# Patient Record
Sex: FEMALE | Race: WHITE | NOT HISPANIC OR LATINO | Employment: UNEMPLOYED | URBAN - METROPOLITAN AREA
[De-identification: names, ages, dates, MRNs, and addresses within clinical notes are randomized per-mention and may not be internally consistent; named-entity substitution may affect disease eponyms.]

---

## 2020-10-06 ENCOUNTER — OFFICE VISIT (OUTPATIENT)
Dept: URGENT CARE | Age: 45
End: 2020-10-06
Payer: COMMERCIAL

## 2020-10-06 VITALS
HEIGHT: 67 IN | BODY MASS INDEX: 27.47 KG/M2 | RESPIRATION RATE: 18 BRPM | DIASTOLIC BLOOD PRESSURE: 78 MMHG | OXYGEN SATURATION: 98 % | TEMPERATURE: 97.3 F | SYSTOLIC BLOOD PRESSURE: 116 MMHG | WEIGHT: 175 LBS | HEART RATE: 71 BPM

## 2020-10-06 DIAGNOSIS — R59.1 LYMPHADENOPATHY: ICD-10-CM

## 2020-10-06 DIAGNOSIS — M26.629 TMJ PAIN DYSFUNCTION SYNDROME: Primary | ICD-10-CM

## 2020-10-06 PROCEDURE — 99213 OFFICE O/P EST LOW 20 MIN: CPT | Performed by: PHYSICIAN ASSISTANT

## 2020-10-06 RX ORDER — MELOXICAM 15 MG/1
TABLET ORAL
COMMUNITY
Start: 2020-05-11

## 2020-10-06 RX ORDER — CITALOPRAM 20 MG/1
TABLET ORAL
COMMUNITY

## 2020-10-06 RX ORDER — ARIPIPRAZOLE 5 MG/1
5 TABLET ORAL DAILY
COMMUNITY
Start: 2020-09-13

## 2020-10-06 RX ORDER — ATORVASTATIN CALCIUM 20 MG/1
TABLET, FILM COATED ORAL
COMMUNITY
Start: 2020-04-23

## 2020-10-06 RX ORDER — ATORVASTATIN CALCIUM 20 MG/1
20 TABLET, FILM COATED ORAL DAILY
COMMUNITY
Start: 2020-07-28

## 2020-10-06 RX ORDER — CLONAZEPAM 1 MG/1
TABLET ORAL
COMMUNITY
Start: 2020-09-18

## 2020-10-06 RX ORDER — CLONAZEPAM 1 MG/1
TABLET ORAL
COMMUNITY
Start: 2020-07-21

## 2020-10-06 RX ORDER — PANTOPRAZOLE SODIUM 40 MG/1
40 TABLET, DELAYED RELEASE ORAL 2 TIMES DAILY
COMMUNITY
Start: 2020-08-12

## 2020-10-06 RX ORDER — CITALOPRAM 40 MG/1
40 TABLET ORAL DAILY
COMMUNITY
Start: 2020-10-01

## 2020-10-06 RX ORDER — CLONAZEPAM 0.5 MG/1
TABLET ORAL
COMMUNITY

## 2020-10-06 RX ORDER — LEVOTHYROXINE SODIUM 0.05 MG/1
50 TABLET ORAL DAILY
COMMUNITY
Start: 2020-07-30

## 2020-10-06 RX ORDER — CLINDAMYCIN HYDROCHLORIDE 300 MG/1
300 CAPSULE ORAL 4 TIMES DAILY
Qty: 40 CAPSULE | Refills: 0 | Status: SHIPPED | OUTPATIENT
Start: 2020-10-06 | End: 2020-10-16

## 2020-10-06 RX ORDER — CITALOPRAM 40 MG/1
40 TABLET ORAL DAILY
COMMUNITY
Start: 2020-07-21

## 2020-10-06 RX ORDER — MELOXICAM 15 MG/1
15 TABLET ORAL DAILY
COMMUNITY
Start: 2020-08-07

## 2020-10-06 RX ORDER — IBUPROFEN 600 MG/1
600 TABLET ORAL EVERY 6 HOURS PRN
Qty: 30 TABLET | Refills: 0 | Status: SHIPPED | OUTPATIENT
Start: 2020-10-06

## 2020-10-06 RX ORDER — LIRAGLUTIDE 6 MG/ML
1.8 INJECTION SUBCUTANEOUS DAILY
COMMUNITY
Start: 2020-05-18

## 2020-10-06 RX ORDER — PEN NEEDLE, DIABETIC 32GX 5/32"
NEEDLE, DISPOSABLE MISCELLANEOUS
COMMUNITY
Start: 2020-09-15

## 2020-10-06 RX ORDER — LEVOTHYROXINE SODIUM 0.05 MG/1
50 TABLET ORAL DAILY
COMMUNITY
Start: 2020-07-31

## 2020-10-06 RX ORDER — ARIPIPRAZOLE 5 MG/1
5 TABLET ORAL DAILY
COMMUNITY
Start: 2020-07-21

## 2022-01-18 ENCOUNTER — OFFICE VISIT (OUTPATIENT)
Dept: URGENT CARE | Age: 47
End: 2022-01-18
Payer: MEDICARE

## 2022-01-18 VITALS — WEIGHT: 145 LBS | HEIGHT: 66 IN | BODY MASS INDEX: 23.3 KG/M2

## 2022-01-18 DIAGNOSIS — B34.9 ACUTE VIRAL SYNDROME: Primary | ICD-10-CM

## 2022-01-18 PROCEDURE — U0005 INFEC AGEN DETEC AMPLI PROBE: HCPCS | Performed by: PHYSICIAN ASSISTANT

## 2022-01-18 PROCEDURE — U0003 INFECTIOUS AGENT DETECTION BY NUCLEIC ACID (DNA OR RNA); SEVERE ACUTE RESPIRATORY SYNDROME CORONAVIRUS 2 (SARS-COV-2) (CORONAVIRUS DISEASE [COVID-19]), AMPLIFIED PROBE TECHNIQUE, MAKING USE OF HIGH THROUGHPUT TECHNOLOGIES AS DESCRIBED BY CMS-2020-01-R: HCPCS | Performed by: PHYSICIAN ASSISTANT

## 2022-01-18 PROCEDURE — 99213 OFFICE O/P EST LOW 20 MIN: CPT | Performed by: PHYSICIAN ASSISTANT

## 2022-01-18 NOTE — LETTER
[facility] [address]  [address] [address]  Dept: [phone]    January 18, 2022    Patient: Fabiana Ortega  YOB: 1975    Fabiana Ortega was seen and evaluated at our [facility]  If Covid test is positive, they may return to work on 01/23/2022 and must be fever free the preceding 24 hours WITHOUT use of fever reducing medications  Upon return, they must then adhere to strict masking for an additional 5 days      Sincerely,    Twin Ambriz PA-C

## 2022-01-18 NOTE — PROGRESS NOTES
[facility]        NAME: Anusha Kuhn is a 55 y o  female  : 1975    MRN: [id number]  DATE: 2022  TIME: 10:21 AM    Assessment and Plan   Acute viral syndrome [B34 9]  1  Acute viral syndrome  COVID Only -Office Collect         Patient Instructions       Follow up with PCP in 3-5 days  Proceed to  ER if symptoms worsen  Chief Complaint     Chief Complaint   Patient presents with    COVID-19     headache, runny nose and congestion symptoms started yesterday  no fever, chills  daughter has covid  History of Present Illness       Patient is a 56 y/o/f [facility] with headache, runny nose and congestion  Symptoms began this morning while at work  Pt daughter positive for Covid-19  Pt is in NAD  URI   This is a new problem  The current episode started today  The problem has been gradually worsening  There has been no fever  Associated symptoms include headaches and rhinorrhea  Pertinent negatives include no abdominal pain, chest pain, coughing, dysuria, ear pain, rash, sore throat or vomiting  Review of Systems   Review of Systems   Constitutional: Negative for chills and fever  HENT: Positive for rhinorrhea  Negative for ear pain and sore throat  Eyes: Negative for pain and visual disturbance  Respiratory: Negative for cough and shortness of breath  Cardiovascular: Negative for chest pain and palpitations  Gastrointestinal: Negative for abdominal pain and vomiting  Genitourinary: Negative for dysuria and hematuria  Musculoskeletal: Negative for arthralgias and back pain  Skin: Negative for color change and rash  Neurological: Positive for headaches  Negative for seizures and syncope  All other systems reviewed and are negative          Current Medications       Current Outpatient Medications:     ARIPiprazole (ABILIFY) 5 mg tablet, Take 5 mg by mouth daily, Disp: , Rfl:     ARIPiprazole (ABILIFY) 5 mg tablet, Take 5 mg by mouth daily, Disp: , Rfl:     atorvastatin (LIPITOR) 20 mg tablet, take 1 tablet by mouth once daily, Disp: , Rfl:     atorvastatin (LIPITOR) 20 mg tablet, Take 20 mg by mouth daily, Disp: , Rfl:     BD Pen Needle Xuan 2nd Gen 32G X 4 MM MISC, USE DAILY WITH VICTOZA, Disp: , Rfl:     citalopram (CeleXA) 20 mg tablet, Take by mouth, Disp: , Rfl:     citalopram (CeleXA) 40 mg tablet, Take 40 mg by mouth daily, Disp: , Rfl:     citalopram (CeleXA) 40 mg tablet, Take 40 mg by mouth daily, Disp: , Rfl:     clonazePAM (KlonoPIN) 0 5 mg tablet, Take by mouth, Disp: , Rfl:     clonazePAM (KlonoPIN) 1 mg tablet, Take 1/2 tab PO PRN daily and 1 tab PO QHS  , Disp: , Rfl:     clonazePAM (KlonoPIN) 1 mg tablet, take 1/2 tablet by mouth once daily if needed AND 1 TABLET AT BEDTIME , Disp: , Rfl:     ibuprofen (MOTRIN) 600 mg tablet, Take 1 tablet (600 mg total) by mouth every 6 (six) hours as needed for mild pain, Disp: 30 tablet, Rfl: 0    levothyroxine 50 mcg tablet, Take 50 mcg by mouth daily, Disp: , Rfl:     levothyroxine 50 mcg tablet, Take 50 mcg by mouth daily, Disp: , Rfl:     liraglutide (Victoza) injection, Inject 1 8 mg under the skin daily, Disp: , Rfl:     meloxicam (MOBIC) 15 mg tablet, take 1 tablet by mouth once daily WITH FOOD, Disp: , Rfl:     meloxicam (MOBIC) 15 mg tablet, Take 15 mg by mouth daily take with food, Disp: , Rfl:     metFORMIN (GLUCOPHAGE) 1000 MG tablet, Take 1,000 mg by mouth 2 (two) times a day with meals, Disp: , Rfl:     pantoprazole (PROTONIX) 40 mg tablet, Take 40 mg by mouth 2 (two) times a day, Disp: , Rfl:     pantoprazole (PROTONIX) 40 mg tablet, Take 40 mg by mouth 2 (two) times a day, Disp: , Rfl:     Current Allergies     Allergies as of 01/18/2022    (No Known Allergies)            The following portions of the patient's history were reviewed and updated as appropriate: allergies, current medications, past family history, past medical history, past social history, past surgical history and problem list      Past Medical History:   Diagnosis Date    Anxiety     COPD (chronic obstructive pulmonary disease) ([facility] [address] [facility]     Depression     [facility]     Gastroparesis     Otitis media        Past Surgical History:   Procedure Laterality Date    APPENDECTOMY      CHOLECYSTECTOMY         History reviewed  No pertinent family history  Medications have been verified  Objective   Ht 5' 6" (1 676 m)   Wt 65 8 kg (145 lb)   BMI 23 40 kg/m²   No LMP recorded  Physical Exam     Physical Exam  Constitutional:       Appearance: Normal appearance  HENT:      Head: Normocephalic and atraumatic  Nose: Nose normal       Mouth/Throat:      Mouth: Mucous membranes are moist    Eyes:      Extraocular Movements: Extraocular movements intact  Conjunctiva/sclera: Conjunctivae normal       Pupils: Pupils are equal, round, and reactive to light  Cardiovascular:      Rate and Rhythm: Normal rate  Pulmonary:      Effort: Pulmonary effort is normal    Musculoskeletal:         General: Normal range of motion  Cervical back: Normal range of motion and neck supple  Skin:     General: Skin is warm and dry  Capillary Refill: Capillary refill takes less than 2 seconds  Neurological:      General: No focal deficit present  Mental Status: She is alert and oriented to person, place, and time     Psychiatric:         Mood and Affect: Mood normal          Behavior: Behavior normal

## 2022-01-19 LAB — SARS-COV-2 RNA RESP QL NAA+PROBE: NEGATIVE

## 2023-06-14 ENCOUNTER — OFFICE VISIT (OUTPATIENT)
Dept: URGENT CARE | Facility: CLINIC | Age: 48
End: 2023-06-14
Payer: COMMERCIAL

## 2023-06-14 ENCOUNTER — HOSPITAL ENCOUNTER (EMERGENCY)
Facility: HOSPITAL | Age: 48
Discharge: HOME | End: 2023-06-14
Attending: EMERGENCY MEDICINE
Payer: COMMERCIAL

## 2023-06-14 VITALS
HEIGHT: 67 IN | HEART RATE: 78 BPM | SYSTOLIC BLOOD PRESSURE: 136 MMHG | BODY MASS INDEX: 29.03 KG/M2 | OXYGEN SATURATION: 97 % | TEMPERATURE: 97.8 F | WEIGHT: 185 LBS | RESPIRATION RATE: 18 BRPM | DIASTOLIC BLOOD PRESSURE: 72 MMHG

## 2023-06-14 VITALS
SYSTOLIC BLOOD PRESSURE: 130 MMHG | RESPIRATION RATE: 16 BRPM | TEMPERATURE: 98 F | HEART RATE: 66 BPM | DIASTOLIC BLOOD PRESSURE: 75 MMHG | OXYGEN SATURATION: 96 %

## 2023-06-14 DIAGNOSIS — R73.9 HIGH BLOOD SUGAR: Primary | ICD-10-CM

## 2023-06-14 DIAGNOSIS — R73.9 HYPERGLYCEMIA: Primary | ICD-10-CM

## 2023-06-14 DIAGNOSIS — E83.42 HYPOMAGNESEMIA: ICD-10-CM

## 2023-06-14 DIAGNOSIS — H53.8 BLURRY VISION, BILATERAL: ICD-10-CM

## 2023-06-14 LAB
ALBUMIN SERPL BCP-MCNC: 4.3 G/DL (ref 3.5–5)
ALP SERPL-CCNC: 96 U/L (ref 34–104)
ALT SERPL W P-5'-P-CCNC: 11 U/L (ref 7–52)
ANION GAP SERPL CALCULATED.3IONS-SCNC: 9 MMOL/L (ref 4–13)
APTT PPP: 26 SECONDS (ref 23–37)
AST SERPL W P-5'-P-CCNC: 10 U/L (ref 13–39)
BASE EX.OXY STD BLDV CALC-SCNC: 73.9 % (ref 60–80)
BASE EXCESS BLDV CALC-SCNC: -2.5 MMOL/L
BASOPHILS # BLD AUTO: 0.07 THOUSANDS/ÂΜL (ref 0–0.1)
BASOPHILS NFR BLD AUTO: 1 % (ref 0–1)
BETA-HYDROXYBUTYRATE: 0.2 MMOL/L
BILIRUB SERPL-MCNC: 0.76 MG/DL (ref 0.2–1)
BUN SERPL-MCNC: 23 MG/DL (ref 5–25)
CALCIUM SERPL-MCNC: 9.4 MG/DL (ref 8.4–10.2)
CHLORIDE SERPL-SCNC: 96 MMOL/L (ref 96–108)
CO2 SERPL-SCNC: 23 MMOL/L (ref 21–32)
CREAT SERPL-MCNC: 1.02 MG/DL (ref 0.6–1.3)
EOSINOPHIL # BLD AUTO: 0.21 THOUSAND/ÂΜL (ref 0–0.61)
EOSINOPHIL NFR BLD AUTO: 2 % (ref 0–6)
ERYTHROCYTE [DISTWIDTH] IN BLOOD BY AUTOMATED COUNT: 11.9 % (ref 11.6–15.1)
GFR SERPL CREATININE-BSD FRML MDRD: 65 ML/MIN/1.73SQ M
GLUCOSE SERPL-MCNC: 328 MG/DL (ref 65–140)
GLUCOSE SERPL-MCNC: 490 MG/DL (ref 65–140)
GLUCOSE SERPL-MCNC: 536 MG/DL (ref 65–140)
GLUCOSE SERPL-MCNC: >500 MG/DL (ref 65–140)
HCO3 BLDV-SCNC: 21.9 MMOL/L (ref 24–30)
HCT VFR BLD AUTO: 43 % (ref 34.8–46.1)
HGB BLD-MCNC: 15.2 G/DL (ref 11.5–15.4)
IMM GRANULOCYTES # BLD AUTO: 0.03 THOUSAND/UL (ref 0–0.2)
IMM GRANULOCYTES NFR BLD AUTO: 0 % (ref 0–2)
INR PPP: 0.9 (ref 0.84–1.19)
LYMPHOCYTES # BLD AUTO: 3.35 THOUSANDS/ÂΜL (ref 0.6–4.47)
LYMPHOCYTES NFR BLD AUTO: 28 % (ref 14–44)
MAGNESIUM SERPL-MCNC: 1.8 MG/DL (ref 1.9–2.7)
MCH RBC QN AUTO: 31.5 PG (ref 26.8–34.3)
MCHC RBC AUTO-ENTMCNC: 35.3 G/DL (ref 31.4–37.4)
MCV RBC AUTO: 89 FL (ref 82–98)
MONOCYTES # BLD AUTO: 0.62 THOUSAND/ÂΜL (ref 0.17–1.22)
MONOCYTES NFR BLD AUTO: 5 % (ref 4–12)
NEUTROPHILS # BLD AUTO: 7.6 THOUSANDS/ÂΜL (ref 1.85–7.62)
NEUTS SEG NFR BLD AUTO: 64 % (ref 43–75)
NRBC BLD AUTO-RTO: 0 /100 WBCS
O2 CT BLDV-SCNC: 17 ML/DL
PCO2 BLDV: 37.4 MM HG (ref 42–50)
PH BLDV: 7.39 [PH] (ref 7.3–7.4)
PHOSPHATE SERPL-MCNC: 3.3 MG/DL (ref 2.7–4.5)
PLATELET # BLD AUTO: 266 THOUSANDS/UL (ref 149–390)
PMV BLD AUTO: 10.2 FL (ref 8.9–12.7)
PO2 BLDV: 43.2 MM HG (ref 35–45)
POTASSIUM SERPL-SCNC: 3.9 MMOL/L (ref 3.5–5.3)
PROT SERPL-MCNC: 6.9 G/DL (ref 6.4–8.4)
PROTHROMBIN TIME: 12.2 SECONDS (ref 11.6–14.5)
RBC # BLD AUTO: 4.83 MILLION/UL (ref 3.81–5.12)
SODIUM SERPL-SCNC: 128 MMOL/L (ref 135–147)
TSH SERPL DL<=0.05 MIU/L-ACNC: 2.4 UIU/ML (ref 0.45–4.5)
WBC # BLD AUTO: 11.88 THOUSAND/UL (ref 4.31–10.16)

## 2023-06-14 PROCEDURE — 36415 COLL VENOUS BLD VENIPUNCTURE: CPT | Performed by: EMERGENCY MEDICINE

## 2023-06-14 PROCEDURE — 83735 ASSAY OF MAGNESIUM: CPT | Performed by: EMERGENCY MEDICINE

## 2023-06-14 PROCEDURE — 82805 BLOOD GASES W/O2 SATURATION: CPT | Performed by: EMERGENCY MEDICINE

## 2023-06-14 PROCEDURE — 82010 KETONE BODYS QUAN: CPT | Performed by: EMERGENCY MEDICINE

## 2023-06-14 PROCEDURE — 84100 ASSAY OF PHOSPHORUS: CPT | Performed by: EMERGENCY MEDICINE

## 2023-06-14 PROCEDURE — 82948 REAGENT STRIP/BLOOD GLUCOSE: CPT

## 2023-06-14 PROCEDURE — 80053 COMPREHEN METABOLIC PANEL: CPT | Performed by: EMERGENCY MEDICINE

## 2023-06-14 PROCEDURE — 84443 ASSAY THYROID STIM HORMONE: CPT | Performed by: EMERGENCY MEDICINE

## 2023-06-14 PROCEDURE — 85610 PROTHROMBIN TIME: CPT | Performed by: EMERGENCY MEDICINE

## 2023-06-14 PROCEDURE — 82948 REAGENT STRIP/BLOOD GLUCOSE: CPT | Performed by: PHYSICIAN ASSISTANT

## 2023-06-14 PROCEDURE — 85730 THROMBOPLASTIN TIME PARTIAL: CPT | Performed by: EMERGENCY MEDICINE

## 2023-06-14 PROCEDURE — 85025 COMPLETE CBC W/AUTO DIFF WBC: CPT | Performed by: EMERGENCY MEDICINE

## 2023-06-14 PROCEDURE — 99213 OFFICE O/P EST LOW 20 MIN: CPT | Performed by: PHYSICIAN ASSISTANT

## 2023-06-14 RX ORDER — TRAZODONE HYDROCHLORIDE 100 MG/1
100 TABLET ORAL
COMMUNITY
Start: 2023-06-12

## 2023-06-14 RX ORDER — LANOLIN ALCOHOL/MO/W.PET/CERES
400 CREAM (GRAM) TOPICAL 2 TIMES DAILY
Status: DISCONTINUED | OUTPATIENT
Start: 2023-06-14 | End: 2023-06-14 | Stop reason: HOSPADM

## 2023-06-14 RX ORDER — ARIPIPRAZOLE 15 MG/1
15 TABLET ORAL DAILY
COMMUNITY
Start: 2023-03-11

## 2023-06-14 RX ADMIN — Medication 400 MG: at 19:21

## 2023-06-14 RX ADMIN — SODIUM CHLORIDE 1000 ML: 0.9 INJECTION, SOLUTION INTRAVENOUS at 18:56

## 2023-06-14 RX ADMIN — INSULIN HUMAN 10 UNITS: 100 INJECTION, SOLUTION PARENTERAL at 19:28

## 2023-06-14 NOTE — ED PROVIDER NOTES
History  Chief Complaint   Patient presents with   • Hyperglycemia - Symptomatic   • Blurred Vision     HPI      This is a very pleasant, nontoxic but unfortunate 22-year-old female presents to emergency department with a chief complaint of elevated blood sugar  Patient recently moved back here from [place] secondary to the suicide of her   While patient was in [place] she was having her psychiatrist right for her insulin and they subsequently stopped doing this therefore over the last 48 hours she has been having blurry vision became concerned because she took her blood sugar and it was elevated greater than 500 at the local urgent care center and sent in here for further evaluation  All of her diabetic medications was renewed in terms of prescriptions and sent over to the local pharmacy  Patient has an [address] [facility] endocrinology appointment on 19 June 2023  Prior to Admission Medications   Prescriptions Last Dose Informant Patient Reported? Taking?    ARIPiprazole (ABILIFY) 15 mg tablet   Yes No   Sig: Take 15 mg by mouth daily   ARIPiprazole (ABILIFY) 5 mg tablet   Yes No   Sig: Take 5 mg by mouth daily   ARIPiprazole (ABILIFY) 5 mg tablet   Yes No   Sig: Take 5 mg by mouth daily   BD Pen Needle Xuan 2nd Gen 32G X 4 MM MISC   Yes No   Sig: USE DAILY WITH VICTOZA   atorvastatin (LIPITOR) 20 mg tablet   Yes No   Sig: take 1 tablet by mouth once daily   Patient not taking: Reported on 6/14/2023   atorvastatin (LIPITOR) 20 mg tablet   Yes No   Sig: Take 20 mg by mouth daily   Patient not taking: Reported on 6/14/2023   citalopram (CeleXA) 20 mg tablet   Yes No   Sig: Take by mouth   Patient not taking: Reported on 6/14/2023   citalopram (CeleXA) 40 mg tablet   Yes No   Sig: Take 40 mg by mouth daily   citalopram (CeleXA) 40 mg tablet   Yes No   Sig: Take 40 mg by mouth daily   Patient not taking: Reported on 6/14/2023   clonazePAM (KlonoPIN) 0 5 mg tablet   Yes No   Sig: Take by mouth   Patient not taking: Reported on 6/14/2023   clonazePAM (KlonoPIN) 1 mg tablet   Yes No   Sig: Take 1/2 tab PO PRN daily and 1 tab PO QHS  Patient not taking: Reported on 6/14/2023   clonazePAM (KlonoPIN) 1 mg tablet   Yes No   Sig: take 1/2 tablet by mouth once daily if needed AND 1 TABLET AT BEDTIME     Patient not taking: Reported on 6/14/2023   ibuprofen (MOTRIN) 600 mg tablet   No No   Sig: Take 1 tablet (600 mg total) by mouth every 6 (six) hours as needed for mild pain   Patient not taking: Reported on 6/14/2023   levothyroxine 50 mcg tablet   Yes No   Sig: Take 50 mcg by mouth daily   Patient not taking: Reported on 6/14/2023   levothyroxine 50 mcg tablet   Yes No   Sig: Take 50 mcg by mouth daily   Patient not taking: Reported on 6/14/2023   liraglutide (Victoza) injection   Yes No   Sig: Inject 1 8 mg under the skin daily   Patient not taking: Reported on 6/14/2023   meloxicam (MOBIC) 15 mg tablet   Yes No   Sig: take 1 tablet by mouth once daily WITH FOOD   Patient not taking: Reported on 6/14/2023   meloxicam (MOBIC) 15 mg tablet   Yes No   Sig: Take 15 mg by mouth daily take with food   Patient not taking: Reported on 6/14/2023   metFORMIN (GLUCOPHAGE) 1000 MG tablet   Yes No   Sig: Take 1,000 mg by mouth 2 (two) times a day with meals   pantoprazole (PROTONIX) 40 mg tablet   Yes No   Sig: Take 40 mg by mouth 2 (two) times a day   Patient not taking: Reported on 6/14/2023   pantoprazole (PROTONIX) 40 mg tablet   Yes No   Sig: Take 40 mg by mouth 2 (two) times a day   Patient not taking: Reported on 6/14/2023   traZODone (DESYREL) 100 mg tablet   Yes No   Sig: Take 100 mg by mouth daily at bedtime as needed      Facility-Administered Medications: None       Past Medical History:   Diagnosis Date   • Anxiety    • COPD (chronic obstructive pulmonary disease) ([facility] [place] [facility] )    • Depression    • Diabetes mellitus ([facility] [place] [facility] )    • Gastroparesis    • Otitis media        Past Surgical History:   Procedure Laterality Date   • APPENDECTOMY     • CHOLECYSTECTOMY         History reviewed  No pertinent family history  I have reviewed and agree with the history as documented  E-Cigarette/Vaping     E-Cigarette/Vaping Substances     Social History     Tobacco Use   • Smoking status: Every Day     Packs/day: 1 00     Types: Cigarettes   • Smokeless tobacco: Never   Substance Use Topics   • Alcohol use: Yes   • Drug use: Never       Review of Systems   Constitutional: Negative  HENT: Negative  Eyes: Negative  Respiratory: Negative  Negative for chest tightness and shortness of breath  Cardiovascular: Negative  Negative for chest pain  Gastrointestinal: Negative  Endocrine: Negative  Genitourinary: Positive for frequency  Musculoskeletal: Negative  Skin: Negative  Allergic/Immunologic: Negative  Neurological: Negative  Negative for dizziness, light-headedness and numbness  Hematological: Negative  Psychiatric/Behavioral: Negative  Physical Exam  Physical Exam  Vitals and nursing note reviewed  Constitutional:       Appearance: Normal appearance  She is normal weight  HENT:      Head: Normocephalic and atraumatic  Right Ear: External ear normal       Left Ear: External ear normal       Nose: Nose normal       Mouth/Throat:      Mouth: Mucous membranes are moist       Pharynx: Oropharynx is clear  Eyes:      Extraocular Movements: Extraocular movements intact  Conjunctiva/sclera: Conjunctivae normal       Pupils: Pupils are equal, round, and reactive to light  Cardiovascular:      Rate and Rhythm: Normal rate and regular rhythm  Pulses: Normal pulses  Heart sounds: Normal heart sounds  Pulmonary:      Effort: Pulmonary effort is normal       Breath sounds: Normal breath sounds  Abdominal:      General: Abdomen is flat  Bowel sounds are normal    Musculoskeletal:         General: Normal range of motion  Skin:     General: Skin is warm        Capillary Refill: Capillary refill takes less than 2 seconds  Neurological:      General: No focal deficit present  Mental Status: She is alert and oriented to person, place, and time  Mental status is at baseline  Psychiatric:         Mood and Affect: Mood normal          Behavior: Behavior normal          Thought Content:  Thought content normal          Judgment: Judgment normal          Vital Signs  ED Triage Vitals [06/14/23 1827]   Temperature Pulse Respirations Blood Pressure SpO2   98 °F (36 7 °C) 61 17 116/74 98 %      Temp Source Heart Rate Source Patient Position - Orthostatic VS BP Location FiO2 (%)   Tympanic Monitor Sitting Left arm --      Pain Score       No Pain           Vitals:    06/14/23 1900 06/14/23 2000 06/14/23 2030 06/14/23 2045   BP: 143/70 133/65 128/71 130/75   Pulse: 64 61 59 66   Patient Position - Orthostatic VS: Lying Lying Lying Lying         Visual Acuity  Visual Acuity    Flowsheet Row Most Recent Value   L Pupil Size (mm) 3   R Pupil Size (mm) 3          ED Medications  Medications   magnesium Oxide (MAG-OX) tablet 400 mg (400 mg Oral Given 6/14/23 1921)   sodium chloride 0 9 % bolus 1,000 mL (0 mL Intravenous Stopped 6/14/23 2100)   sodium chloride 0 9 % bolus 1,000 mL (0 mL Intravenous Stopped 6/14/23 2100)   insulin regular (HumuLIN R,NovoLIN R) injection 10 Units (10 Units Subcutaneous Given 6/14/23 1928)       Diagnostic Studies  Results Reviewed     Procedure Component Value Units Date/Time    Fingerstick Glucose (POCT) [[id number]]  (Abnormal) Collected: 06/14/23 2102    Lab Status: Final result Updated: 06/14/23 2103     POC Glucose 328 mg/dl     TSH [[id number]]  (Normal) Collected: 06/14/23 1834    Lab Status: Final result Specimen: Blood from Arm, Right Updated: 06/14/23 1915     TSH 3RD GENERATON 2 403 uIU/mL     Comprehensive metabolic panel [[id number]]  (Abnormal) Collected: 06/14/23 1834    Lab Status: Final result Specimen: Blood from Arm, Right Updated: 06/14/23 1915     Sodium 128 mmol/L Potassium 3 9 mmol/L      Chloride 96 mmol/L      CO2 23 mmol/L      ANION GAP 9 mmol/L      BUN 23 mg/dL      Creatinine 1 02 mg/dL      Glucose 536 mg/dL      Calcium 9 4 mg/dL      AST 10 U/L      ALT 11 U/L      Alkaline Phosphatase 96 U/L      Total Protein 6 9 g/dL      Albumin 4 3 g/dL      Total Bilirubin 0 76 mg/dL      eGFR 65 ml/min/1 73sq m     Narrative:      [place] guidelines for Chronic Kidney Disease (CKD):   •  Stage 1 with normal or high GFR (GFR > 90 mL/min/1 73 square meters)  •  Stage 2 Mild CKD (GFR = 60-89 mL/min/1 73 square meters)  •  Stage 3A Moderate CKD (GFR = 45-59 mL/min/1 73 square meters)  •  Stage 3B Moderate CKD (GFR = 30-44 mL/min/1 73 square meters)  •  Stage 4 Severe CKD (GFR = 15-29 mL/min/1 73 square meters)  •  Stage 5 End Stage CKD (GFR <15 mL/min/1 73 square meters)  Note: GFR calculation is accurate only with a steady state creatinine    Magnesium [[id number]]  (Abnormal) Collected: 06/14/23 1834    Lab Status: Final result Specimen: Blood from Arm, Right Updated: 06/14/23 1902     Magnesium 1 8 mg/dL     Phosphorus [[id number]]  (Normal) Collected: 06/14/23 1834    Lab Status: Final result Specimen: Blood from Arm, Right Updated: 06/14/23 1902     Phosphorus 3 3 mg/dL     Protime-INR [[id number]]  (Normal) Collected: 06/14/23 1834    Lab Status: Final result Specimen: Blood from Arm, Right Updated: 06/14/23 1853     Protime 12 2 seconds      INR 0 90    APTT [[id number]]  (Normal) Collected: 06/14/23 1834    Lab Status: Final result Specimen: Blood from Arm, Right Updated: 06/14/23 1853     PTT 26 seconds     Beta Hydroxybutyrate [[id number]]  (Normal) Collected: 06/14/23 1834    Lab Status: Final result Specimen: Blood from Arm, Right Updated: 06/14/23 1842     BETA-HYDROXYBUTYRATE 0 2 mmol/L     Blood gas, venous [[id number]]  (Abnormal) Collected: 06/14/23 1834    Lab Status: Final result Specimen: Blood from Arm, Right Updated: 06/14/23 7712 pH, Alex 7 386     pCO2, Alex 37 4 mm Hg      pO2, Alex 43 2 mm Hg      HCO3, Alex 21 9 mmol/L      Base Excess, Alex -2 5 mmol/L      O2 Content, Alex 17 0 ml/dL      O2 HGB, VENOUS 73 9 %     CBC and differential [[id number]]  (Abnormal) Collected: 06/14/23 1834    Lab Status: Final result Specimen: Blood from Arm, Right Updated: 06/14/23 1840     WBC 11 88 Thousand/uL      RBC 4 83 Million/uL      Hemoglobin 15 2 g/dL      Hematocrit 43 0 %      MCV 89 fL      MCH 31 5 pg      MCHC 35 3 g/dL      RDW 11 9 %      MPV 10 2 fL      Platelets 878 Thousands/uL      nRBC 0 /100 WBCs      Neutrophils Relative 64 %      Immat GRANS % 0 %      Lymphocytes Relative 28 %      Monocytes Relative 5 %      Eosinophils Relative 2 %      Basophils Relative 1 %      Neutrophils Absolute 7 60 Thousands/µL      Immature Grans Absolute 0 03 Thousand/uL      Lymphocytes Absolute 3 35 Thousands/µL      Monocytes Absolute 0 62 Thousand/µL      Eosinophils Absolute 0 21 Thousand/µL      Basophils Absolute 0 07 Thousands/µL     Fingerstick Glucose (POCT) [[id number]]  (Abnormal) Collected: 06/14/23 1826    Lab Status: Final result Updated: 06/14/23 1832     POC Glucose 490 mg/dl                  No orders to display              Procedures  CriticalCare Time    Date/Time: 6/14/2023 10:25 PM    Performed by: Reta Kim DO  Authorized by: Reta Kim DO    Critical care provider statement:     Critical care time (minutes):  35    Critical care start time:  6/14/2023 6:40 PM    Critical care end time:  6/14/2023 9:00 PM    Critical care was necessary to treat or prevent imminent or life-threatening deterioration of the following conditions:  CNS failure or compromise, dehydration and endocrine crisis    Critical care was time spent personally by me on the following activities:  Obtaining history from patient or surrogate, development of treatment plan with patient or surrogate, evaluation of patient's response to treatment, examination of patient, review of old charts, re-evaluation of patient's condition, ordering and review of radiographic studies, ordering and review of laboratory studies and ordering and performing treatments and interventions             ED Course  ED Course as of 06/14/23 2227 Wed Jun 14, 2023 1839 Patient seen and evaluated orders placed blood sugar greater than 500 at urgent care, blurry vision associated with this has not been taking her medication for extended period of time secondary to recently moving back here from [place]  Symptoms consistent with hypoglycemia  No chest pain no shortness of breath    Differential diagnosis in this patient is as follows: Hyperlycemia versus DKA as other electrolyte abnormalities   1919 Patient's blood sugars at 536, no evidence of gap metabolic acidosis we will proceed with IV fluid hydration and recheck labs  2102 POC glucose: 328  SBIRT 22yo+    Flowsheet Row Most Recent Value   Initial Alcohol Screen: US AUDIT-C     1  How often do you have a drink containing alcohol? 0 Filed at: 06/14/2023 1837   2  How many drinks containing alcohol do you have on a typical day you are drinking? 0 Filed at: 06/14/2023 1837   3a  Male UNDER 65: How often do you have five or more drinks on one occasion? 0 Filed at: 06/14/2023 1837   3b  FEMALE Any Age, or MALE 65+: How often do you have 4 or more drinks on one occassion? 0 Filed at: 06/14/2023 1837   Audit-C Score 0 Filed at: 06/14/2023 1837   CANDIS: How many times in the past year have you    Used an illegal drug or used a prescription medication for non-medical reasons?  Never Filed at: 06/14/2023 1837                    Medical Decision Making  Poor medication compliance secondary to life events described in the HPI, patient was given IV fluid hydration x2 L with insulin, blood sugar came down to an acceptable limit and the fact that she has had treatment noncompliance for months "being out of her diabetic medications, patient has a follow-up with [facility] physician group endocrinology next week, all her diabetic medications have been renewed by a prior provider at urgent care, sent to the pharmacy, patient stable for discharge, anticipatory guidance given to the patient about her blurry vision could be related to the elevated blood sugar  Portions of the record may have been created with voice recognition software  Occasional wrong word or \"sound a like\" substitutions may have occurred due to the inherent limitations of voice recognition software  Read the chart carefully and recognize, using context, where substitutions have occurred  Counseling: I had a detailed discussion with the patient and/or guardian regarding: the historical points, exam findings, and any diagnostic results supporting the discharge diagnosis, lab results, radiology results, discharge instructions reviewed with patient and/or family/caregiver and understanding was verbalized  Instructions given to return to the emergency department if symptoms worsen or persist, or if there are any questions or concerns that arise at home      Amount and/or Complexity of Data Reviewed  Labs: ordered  Risk  OTC drugs  Disposition  Final diagnoses:   Hyperglycemia   Hypomagnesemia     Time reflects when diagnosis was documented in both MDM as applicable and the Disposition within this note     Time User Action Codes Description Comment    6/14/2023  7:08 PM Akira Koroma, [id number] [address] [R73 9] Hyperglycemia     6/14/2023  7:08 PM Karen Kirkland Add [E83 42] Hypomagnesemia       ED Disposition     ED Disposition   Discharge    Condition   Stable    Date/Time   Wed Jun 14, 2023  7:08 PM    Comment   Lisa Check discharge to home/self care                 Follow-up Information     Follow up With Specialties Details Why Dion Bean MD Family Medicine   [address] " [id number]  [phone]            Discharge Medication List as of 6/14/2023  9:19 PM      CONTINUE these medications which have NOT CHANGED    Details   !! ARIPiprazole (ABILIFY) 15 mg tablet Take 15 mg by mouth daily, Starting Sat 3/11/2023, Historical Med      !! ARIPiprazole (ABILIFY) 5 mg tablet Take 5 mg by mouth daily, Starting Tue 7/21/2020, Historical Med      !! ARIPiprazole (ABILIFY) 5 mg tablet Take 5 mg by mouth daily, Starting Sun 9/13/2020, Historical Med      !! atorvastatin (LIPITOR) 20 mg tablet take 1 tablet by mouth once daily, Historical Med      !! atorvastatin (LIPITOR) 20 mg tablet Take 20 mg by mouth daily, Starting Tue 7/28/2020, Historical Med      BD Pen Needle Xuan 2nd Gen 32G X 4 MM MISC USE DAILY WITH VICTOZA, Historical Med      !! citalopram (CeleXA) 20 mg tablet Take by mouth, Historical Med      !! citalopram (CeleXA) 40 mg tablet Take 40 mg by mouth daily, Starting Tue 7/21/2020, Historical Med      !! citalopram (CeleXA) 40 mg tablet Take 40 mg by mouth daily, Starting Thu 10/1/2020, Historical Med      !! clonazePAM (KlonoPIN) 0 5 mg tablet Take by mouth, Historical Med      !! clonazePAM (KlonoPIN) 1 mg tablet Take 1/2 tab PO PRN daily and 1 tab PO QHS  , Historical Med      !! clonazePAM (KlonoPIN) 1 mg tablet take 1/2 tablet by mouth once daily if needed AND 1 TABLET AT BEDTIME , Historical Med      ibuprofen (MOTRIN) 600 mg tablet Take 1 tablet (600 mg total) by mouth every 6 (six) hours as needed for mild pain, Starting Tue 10/6/2020, Normal      !! levothyroxine 50 mcg tablet Take 50 mcg by mouth daily, Starting Thu 7/30/2020, Historical Med      !! levothyroxine 50 mcg tablet Take 50 mcg by mouth daily, Starting Fri 7/31/2020, Historical Med      liraglutide (Victoza) injection Inject 1 8 mg under the skin daily, Starting Mon 5/18/2020, Historical Med      !! meloxicam (MOBIC) 15 mg tablet take 1 tablet by mouth once daily WITH FOOD, Historical Med      !! meloxicam (MOBIC) 15 mg tablet Take 15 mg by mouth daily take with food, Starting Fri 8/7/2020, Historical Med      metFORMIN (GLUCOPHAGE) 1000 MG tablet Take 1,000 mg by mouth 2 (two) times a day with meals, Starting Fri 8/7/2020, Historical Med      !! pantoprazole (PROTONIX) 40 mg tablet Take 40 mg by mouth 2 (two) times a day, Starting Wed 8/12/2020, Historical Med      !! pantoprazole (PROTONIX) 40 mg tablet Take 40 mg by mouth 2 (two) times a day, Starting Wed 8/12/2020, Historical Med      traZODone (DESYREL) 100 mg tablet Take 100 mg by mouth daily at bedtime as needed, Starting Mon 6/12/2023, Historical Med       !! - Potential duplicate medications found  Please discuss with provider  No discharge procedures on file      PDMP Review     None          ED Provider  Electronically Signed by           Ashley Chance III, DO  06/14/23 4477

## 2023-06-14 NOTE — PROGRESS NOTES
[facility] [employer] [facility]      NAME: Yamila Pierre is a 50 y o  female  : 1975    MRN: [id number]  DATE: 2023  TIME: 6:21 PM    Assessment and Plan   High blood sugar [R73 9]  1  High blood sugar  Transfer to other facility      2  Blurry vision, bilateral  Transfer to other facility          Patient Instructions   Proceed to ER via ambulance transfer  Chief Complaint     Chief Complaint   Patient presents with   • Blood Sugar Problem     Accu check over 500  Moved from [place] and has not been taking her insulin or Metformin  History of Present Illness   Yamila Pierre presents to the clinic c/o    Patient reports she moved from [place] and has not been taking her insulin or Metformin  Today her sugar was in the 400s, called endo and they told her to go to the hospital  She retook her blood glucose and it was over 500 so presented to the urgent care  Denies dizziness or lightheadedness  Admits to blurry vision b/l  No headaches  No abdominal pain  Denies any chest pain or SOB  Review of Systems   Review of Systems   Constitutional: Negative for chills, diaphoresis, fatigue and fever  HENT: Negative for congestion, ear discharge, ear pain and facial swelling  Eyes: Positive for visual disturbance (b/l blurry vision)  Negative for photophobia, pain, discharge, redness and itching  Respiratory: Negative for apnea, cough, chest tightness, shortness of breath and wheezing  Cardiovascular: Negative for chest pain and palpitations  Gastrointestinal: Negative for abdominal pain  Skin: Negative for color change, rash and wound  Neurological: Negative for dizziness and headaches  Hematological: Negative for adenopathy           Current Medications     Long-Term Medications   Medication Sig Dispense Refill   • ARIPiprazole (ABILIFY) 5 mg tablet Take 5 mg by mouth daily     • ARIPiprazole (ABILIFY) 5 mg tablet Take 5 mg by mouth daily     • BD Pen Needle Xuan 2nd Gen 32G X 4 MM MISC USE DAILY WITH VICTOZA     • citalopram (CeleXA) 40 mg tablet Take 40 mg by mouth daily     • metFORMIN (GLUCOPHAGE) 1000 MG tablet Take 1,000 mg by mouth 2 (two) times a day with meals     • ARIPiprazole (ABILIFY) 15 mg tablet Take 15 mg by mouth daily     • atorvastatin (LIPITOR) 20 mg tablet take 1 tablet by mouth once daily (Patient not taking: Reported on 6/14/2023)     • atorvastatin (LIPITOR) 20 mg tablet Take 20 mg by mouth daily (Patient not taking: Reported on 6/14/2023)     • citalopram (CeleXA) 20 mg tablet Take by mouth (Patient not taking: Reported on 6/14/2023)     • citalopram (CeleXA) 40 mg tablet Take 40 mg by mouth daily (Patient not taking: Reported on 6/14/2023)     • clonazePAM (KlonoPIN) 0 5 mg tablet Take by mouth (Patient not taking: Reported on 6/14/2023)     • clonazePAM (KlonoPIN) 1 mg tablet Take 1/2 tab PO PRN daily and 1 tab PO QHS   (Patient not taking: Reported on 6/14/2023)     • clonazePAM (KlonoPIN) 1 mg tablet take 1/2 tablet by mouth once daily if needed AND 1 TABLET AT BEDTIME  (Patient not taking: Reported on 6/14/2023)     • ibuprofen (MOTRIN) 600 mg tablet Take 1 tablet (600 mg total) by mouth every 6 (six) hours as needed for mild pain (Patient not taking: Reported on 6/14/2023) 30 tablet 0   • levothyroxine 50 mcg tablet Take 50 mcg by mouth daily (Patient not taking: Reported on 6/14/2023)     • levothyroxine 50 mcg tablet Take 50 mcg by mouth daily (Patient not taking: Reported on 6/14/2023)     • liraglutide (Victoza) injection Inject 1 8 mg under the skin daily (Patient not taking: Reported on 6/14/2023)     • meloxicam (MOBIC) 15 mg tablet take 1 tablet by mouth once daily WITH FOOD (Patient not taking: Reported on 6/14/2023)     • meloxicam (MOBIC) 15 mg tablet Take 15 mg by mouth daily take with food (Patient not taking: Reported on 6/14/2023)     • pantoprazole (PROTONIX) 40 mg tablet Take 40 mg by mouth 2 (two) times a day (Patient not taking: Reported on "6/14/2023)     • pantoprazole (PROTONIX) 40 mg tablet Take 40 mg by mouth 2 (two) times a day (Patient not taking: Reported on 6/14/2023)     • traZODone (DESYREL) 100 mg tablet Take 100 mg by mouth daily at bedtime as needed         Current Allergies     Allergies as of 06/14/2023   • (No Known Allergies)            The following portions of the patient's history were reviewed and updated as appropriate: allergies, current medications, past family history, past medical history, past social history, past surgical history and problem list   Past Medical History:   Diagnosis Date   • Anxiety    • COPD (chronic obstructive pulmonary disease) ([facility] [address] )    • Depression    • Diabetes mellitus ([facility] )    • Gastroparesis    • Otitis media      Past Surgical History:   Procedure Laterality Date   • APPENDECTOMY     • CHOLECYSTECTOMY       Social History     Socioeconomic History   • Marital status: /Civil Union     Spouse name: Not on file   • Number of children: Not on file   • Years of education: Not on file   • Highest education level: Not on file   Occupational History   • Not on file   Tobacco Use   • Smoking status: Every Day     Packs/day: 1 00     Types: Cigarettes   • Smokeless tobacco: Never   Substance and Sexual Activity   • Alcohol use: Yes   • Drug use: Never   • Sexual activity: Not on file   Other Topics Concern   • Not on file   Social History Narrative   • Not on file     Social Determinants of Health     Financial Resource Strain: Not on file   Food Insecurity: Not on file   Transportation Needs: Not on file   Physical Activity: Not on file   Stress: Not on file   Social Connections: Not on file   Intimate Partner Violence: Not on file   Housing Stability: Not on file       Objective   /72   Pulse 78   Temp 97 8 °F (36 6 °C)   Resp 18   Ht 5' 7\" (1 702 m)   Wt 83 9 kg (185 lb)   SpO2 97%   BMI 28 98 kg/m²      Physical Exam     Physical Exam  Vitals and nursing note reviewed   " Constitutional:       General: She is not in acute distress  Appearance: She is well-developed  She is not diaphoretic  HENT:      Head: Normocephalic and atraumatic  Right Ear: External ear normal       Left Ear: External ear normal       Nose: Nose normal       Mouth/Throat:      Mouth: Mucous membranes are moist       Pharynx: No oropharyngeal exudate or posterior oropharyngeal erythema  Eyes:      General: No scleral icterus  Right eye: No discharge  Left eye: No discharge  Conjunctiva/sclera: Conjunctivae normal    Cardiovascular:      Rate and Rhythm: Normal rate and regular rhythm  Heart sounds: Normal heart sounds  No murmur heard  No friction rub  No gallop  Pulmonary:      Effort: Pulmonary effort is normal  No respiratory distress  Breath sounds: Normal breath sounds  No decreased breath sounds, wheezing, rhonchi or rales  Abdominal:      General: Bowel sounds are normal       Palpations: Abdomen is soft  Tenderness: There is no abdominal tenderness  Skin:     General: Skin is warm and dry  Coloration: Skin is not pale  Findings: No erythema or rash  Neurological:      Mental Status: She is alert and oriented to person, place, and time  Psychiatric:         Behavior: Behavior normal          Thought Content:  Thought content normal          Judgment: Judgment normal          Mary Grace Peacock PA-C

## 2023-06-22 LAB
LEFT EYE DIABETIC RETINOPATHY: NORMAL
RIGHT EYE DIABETIC RETINOPATHY: NORMAL

## 2023-09-18 ENCOUNTER — TELEPHONE (OUTPATIENT)
Dept: PSYCHIATRY | Facility: CLINIC | Age: 48
End: 2023-09-18

## 2023-09-18 NOTE — TELEPHONE ENCOUNTER
Patient has been added to the Medication Management wait list without a referral.    Insurance: [employer]  Insurance Type:    Commercial []   Medicaid [x]   [place] (if applicable)   Medicare []  Location Preference: [place]  Provider Preference: no pref  Virtual: Yes [] No [x]  Were outside resources sent: Yes [] No [x]    Patient has been added to the Talk Therapy wait list without a referral.    Insurance: [employer]  Insurance Type:    Commercial []   Medicaid [x]   [place] (if applicable)   Medicare []  Location Preference: [place]  Provider Preference: no pref  Virtual: Yes [] No [x]  Were outside resources sent: Yes [] No [x]

## 2023-10-10 ENCOUNTER — OFFICE VISIT (OUTPATIENT)
Dept: URGENT CARE | Age: 48
End: 2023-10-10
Payer: COMMERCIAL

## 2023-10-10 VITALS
BODY MASS INDEX: 27.15 KG/M2 | RESPIRATION RATE: 18 BRPM | HEART RATE: 66 BPM | OXYGEN SATURATION: 99 % | WEIGHT: 173 LBS | HEIGHT: 67 IN | TEMPERATURE: 98.6 F

## 2023-10-10 DIAGNOSIS — K08.89 PAIN, DENTAL: ICD-10-CM

## 2023-10-10 DIAGNOSIS — K06.8 PAIN IN GUMS: ICD-10-CM

## 2023-10-10 DIAGNOSIS — K06.8 PAIN IN GUMS: Primary | ICD-10-CM

## 2023-10-10 PROCEDURE — 99213 OFFICE O/P EST LOW 20 MIN: CPT

## 2023-10-10 RX ORDER — AMOXICILLIN AND CLAVULANATE POTASSIUM 875; 125 MG/1; MG/1
1 TABLET, FILM COATED ORAL EVERY 12 HOURS SCHEDULED
Qty: 14 TABLET | Refills: 0 | Status: SHIPPED | OUTPATIENT
Start: 2023-10-10 | End: 2023-10-17

## 2023-10-10 RX ORDER — LIDOCAINE HYDROCHLORIDE 20 MG/ML
SOLUTION OROPHARYNGEAL
Qty: 100 ML | Refills: 1 | Status: SHIPPED | OUTPATIENT
Start: 2023-10-10

## 2023-10-10 RX ORDER — LIDOCAINE HYDROCHLORIDE 20 MG/ML
10 SOLUTION OROPHARYNGEAL 4 TIMES DAILY PRN
Qty: 100 ML | Refills: 1 | Status: SHIPPED | OUTPATIENT
Start: 2023-10-10 | End: 2023-10-10

## 2023-10-10 NOTE — PATIENT INSTRUCTIONS
Take prescription as prescribed  Start viscous lidocaine as prescribed  Follow up with dentist  Salt water gargles  Ice over area  Ibuprofen 600-800mg + Tylenol 1000mg every 6 hours provided you do not have a history of kidney or liver dysfunction. Do not exceed this amount. Follow up with PCP in 3-5 days. Proceed to ER if symptoms worsen. Eat yogurt with live and active cultures and/or take a probiotic at least 3 hours before or after antibiotic dose. Monitor stool for diarrhea and/or blood. If this occurs, contact primary care doctor ASAP.

## 2023-10-10 NOTE — PROGRESS NOTES
[facility]        NAME: Austin Leyva is a 50 y.o. female  : 1975    MRN: [id number]  DATE: October 10, 2023  TIME: 6:49 PM    Assessment and Plan   Pain in gums [K06.8]  1. Pain in gums  Lidocaine Viscous HCl (XYLOCAINE) 2 % mucosal solution    amoxicillin-clavulanate (AUGMENTIN) 875-125 mg per tablet      2. Pain, dental  Lidocaine Viscous HCl (XYLOCAINE) 2 % mucosal solution    amoxicillin-clavulanate (AUGMENTIN) 875-125 mg per tablet            Patient Instructions     Take prescription as prescribed  Start viscous lidocaine as prescribed  Follow up with dentist  Salt water gargles  Ice over area  Ibuprofen 600-800mg + Tylenol 1000mg every 6 hours provided you do not have a history of kidney or liver dysfunction. Do not exceed this amount. Follow up with PCP in 3-5 days. Proceed to ER if symptoms worsen. Eat yogurt with live and active cultures and/or take a probiotic at least 3 hours before or after antibiotic dose. Monitor stool for diarrhea and/or blood. If this occurs, contact primary care doctor ASAP. Chief Complaint     Chief Complaint   Patient presents with   • Dental Pain     Started with pimple on right side upper gum back. Attempted OTC with no relief. No injury or trauma to mouth. History of Present Illness       Patient is a 51 yo female with no significant PMH presenting in the clinic today for mouth pain x 4 days. Patient notes she had multiple bottom teeth removed within the past year. She states she was given dentures but notes she "barely uses them". Patient locates her pain to the right lower back gums. Denies fever, chills, headache, dizziness, visual changes, ear pain, trismus, drooling, neck pain, sore throat, difficulty swallowing, chest pain, and SOB. Admits the use of Anbesol gel and ibuprofen for sx management. Denies recent sick contacts. Denies recent surgeries and antibiotic use.       Review of Systems   Review of Systems   Constitutional: Negative for chills and fatigue. HENT: Positive for dental problem. Negative for drooling, ear pain, facial swelling, sore throat, trouble swallowing and voice change. Eyes: Negative for visual disturbance. Respiratory: Negative for shortness of breath. Cardiovascular: Negative for chest pain. Musculoskeletal: Negative for neck pain. Skin: Negative for rash and wound. Neurological: Negative for dizziness and headaches.          Current Medications       Current Outpatient Medications:   •  amoxicillin-clavulanate (AUGMENTIN) 875-125 mg per tablet, Take 1 tablet by mouth every 12 (twelve) hours for 7 days, Disp: 14 tablet, Rfl: 0  •  ARIPiprazole (ABILIFY) 15 mg tablet, Take 15 mg by mouth daily, Disp: , Rfl:   •  ARIPiprazole (ABILIFY) 5 mg tablet, Take 5 mg by mouth daily, Disp: , Rfl:   •  ARIPiprazole (ABILIFY) 5 mg tablet, Take 5 mg by mouth daily, Disp: , Rfl:   •  BD Pen Needle Xuan 2nd Gen 32G X 4 MM MISC, USE DAILY WITH VICTOZA, Disp: , Rfl:   •  citalopram (CeleXA) 40 mg tablet, Take 40 mg by mouth daily, Disp: , Rfl:   •  Lidocaine Viscous HCl (XYLOCAINE) 2 % mucosal solution, Swish and spit 10 mL 4 (four) times a day as needed for mouth pain or discomfort, Disp: 100 mL, Rfl: 1  •  metFORMIN (GLUCOPHAGE) 1000 MG tablet, Take 1,000 mg by mouth 2 (two) times a day with meals, Disp: , Rfl:   •  traZODone (DESYREL) 100 mg tablet, Take 100 mg by mouth daily at bedtime as needed, Disp: , Rfl:   •  atorvastatin (LIPITOR) 20 mg tablet, take 1 tablet by mouth once daily (Patient not taking: Reported on 10/10/2023), Disp: , Rfl:   •  atorvastatin (LIPITOR) 20 mg tablet, Take 20 mg by mouth daily (Patient not taking: Reported on 6/14/2023), Disp: , Rfl:   •  citalopram (CeleXA) 20 mg tablet, Take by mouth (Patient not taking: Reported on 6/14/2023), Disp: , Rfl:   •  citalopram (CeleXA) 40 mg tablet, Take 40 mg by mouth daily (Patient not taking: Reported on 6/14/2023), Disp: , Rfl:   •  clonazePAM (KlonoPIN) 0.5 mg tablet, Take by mouth (Patient not taking: Reported on 6/14/2023), Disp: , Rfl:   •  clonazePAM (KlonoPIN) 1 mg tablet, Take 1/2 tab PO PRN daily and 1 tab PO QHS.  (Patient not taking: Reported on 6/14/2023), Disp: , Rfl:   •  clonazePAM (KlonoPIN) 1 mg tablet, take 1/2 tablet by mouth once daily if needed AND 1 TABLET AT BEDTIME. (Patient not taking: Reported on 6/14/2023), Disp: , Rfl:   •  ibuprofen (MOTRIN) 600 mg tablet, Take 1 tablet (600 mg total) by mouth every 6 (six) hours as needed for mild pain (Patient not taking: Reported on 6/14/2023), Disp: 30 tablet, Rfl: 0  •  levothyroxine 50 mcg tablet, Take 50 mcg by mouth daily (Patient not taking: Reported on 6/14/2023), Disp: , Rfl:   •  levothyroxine 50 mcg tablet, Take 50 mcg by mouth daily (Patient not taking: Reported on 6/14/2023), Disp: , Rfl:   •  liraglutide (Victoza) injection, Inject 1.8 mg under the skin daily (Patient not taking: Reported on 6/14/2023), Disp: , Rfl:   •  meloxicam (MOBIC) 15 mg tablet, take 1 tablet by mouth once daily WITH FOOD (Patient not taking: Reported on 6/14/2023), Disp: , Rfl:   •  meloxicam (MOBIC) 15 mg tablet, Take 15 mg by mouth daily take with food (Patient not taking: Reported on 6/14/2023), Disp: , Rfl:   •  pantoprazole (PROTONIX) 40 mg tablet, Take 40 mg by mouth 2 (two) times a day (Patient not taking: Reported on 6/14/2023), Disp: , Rfl:   •  pantoprazole (PROTONIX) 40 mg tablet, Take 40 mg by mouth 2 (two) times a day (Patient not taking: Reported on 6/14/2023), Disp: , Rfl:     Current Allergies     Allergies as of 10/10/2023   • (No Known Allergies)            The following portions of the patient's history were reviewed and updated as appropriate: allergies, current medications, past family history, past medical history, past social history, past surgical history and problem list.     Past Medical History:   Diagnosis Date   • Anxiety    • COPD (chronic obstructive pulmonary disease) ([address])    • Depression    • Diabetes mellitus ([address])    • Gastroparesis    • Otitis media        Past Surgical History:   Procedure Laterality Date   • APPENDECTOMY     • CHOLECYSTECTOMY         History reviewed. No pertinent family history. Medications have been verified. Objective   Pulse 66   Temp 98.6 °F (37 °C) (Tympanic)   Resp 18   Ht 5' 7" (1.702 m)   Wt 78.5 kg (173 lb)   SpO2 99%   BMI 27.10 kg/m²        Physical Exam     Physical Exam  Vitals reviewed. Constitutional:       General: She is not in acute distress. Appearance: Normal appearance. She is normal weight. She is not ill-appearing. HENT:      Head: Normocephalic. Nose: Nose normal.      Mouth/Throat:      Lips: Pink. Mouth: Mucous membranes are moist.      Dentition: Abnormal dentition. Has dentures. Dental caries present. No gingival swelling or gum lesions. Pharynx: Oropharynx is clear. Uvula midline. No pharyngeal swelling, oropharyngeal exudate, posterior oropharyngeal erythema or uvula swelling. Tonsils: No tonsillar exudate or tonsillar abscesses. 1+ on the right. 1+ on the left. Eyes:      Conjunctiva/sclera: Conjunctivae normal.   Cardiovascular:      Rate and Rhythm: Normal rate and regular rhythm. Pulses: Normal pulses. Heart sounds: Normal heart sounds. No murmur heard. No friction rub. No gallop. Pulmonary:      Effort: Pulmonary effort is normal.      Breath sounds: Normal breath sounds. No wheezing, rhonchi or rales. Musculoskeletal:      Cervical back: Normal range of motion and neck supple. No tenderness. Lymphadenopathy:      Cervical: No cervical adenopathy. Skin:     General: Skin is warm. Findings: No rash. Neurological:      Mental Status: She is alert.    Psychiatric:         Mood and Affect: Mood normal.         Behavior: Behavior normal. ACC/AHA stage D systolic heart failure

## 2023-11-08 ENCOUNTER — APPOINTMENT (EMERGENCY)
Dept: CT IMAGING | Facility: HOSPITAL | Age: 48
End: 2023-11-08
Payer: COMMERCIAL

## 2023-11-08 ENCOUNTER — HOSPITAL ENCOUNTER (EMERGENCY)
Facility: HOSPITAL | Age: 48
Discharge: HOME | End: 2023-11-08
Attending: EMERGENCY MEDICINE
Payer: COMMERCIAL

## 2023-11-08 VITALS
SYSTOLIC BLOOD PRESSURE: 127 MMHG | TEMPERATURE: 97 F | RESPIRATION RATE: 20 BRPM | DIASTOLIC BLOOD PRESSURE: 71 MMHG | OXYGEN SATURATION: 97 % | HEART RATE: 62 BPM

## 2023-11-08 DIAGNOSIS — R20.2 PARESTHESIA: Primary | ICD-10-CM

## 2023-11-08 DIAGNOSIS — E83.42 HYPOMAGNESEMIA: ICD-10-CM

## 2023-11-08 LAB
ALBUMIN SERPL BCP-MCNC: 4.3 G/DL (ref 3.5–5)
ALP SERPL-CCNC: 70 U/L (ref 34–104)
ALT SERPL W P-5'-P-CCNC: 12 U/L (ref 7–52)
ANION GAP SERPL CALCULATED.3IONS-SCNC: 6 MMOL/L
AST SERPL W P-5'-P-CCNC: 12 U/L (ref 13–39)
BASOPHILS # BLD AUTO: 0.06 THOUSANDS/ÂΜL (ref 0–0.1)
BASOPHILS NFR BLD AUTO: 1 % (ref 0–1)
BILIRUB SERPL-MCNC: 0.51 MG/DL (ref 0.2–1)
BUN SERPL-MCNC: 10 MG/DL (ref 5–25)
CALCIUM SERPL-MCNC: 9.8 MG/DL (ref 8.4–10.2)
CHLORIDE SERPL-SCNC: 102 MMOL/L (ref 96–108)
CO2 SERPL-SCNC: 29 MMOL/L (ref 21–32)
CREAT SERPL-MCNC: 0.91 MG/DL (ref 0.6–1.3)
EOSINOPHIL # BLD AUTO: 0.38 THOUSAND/ÂΜL (ref 0–0.61)
EOSINOPHIL NFR BLD AUTO: 3 % (ref 0–6)
ERYTHROCYTE [DISTWIDTH] IN BLOOD BY AUTOMATED COUNT: 12.5 % (ref 11.6–15.1)
GFR SERPL CREATININE-BSD FRML MDRD: 74 ML/MIN/1.73SQ M
GLUCOSE SERPL-MCNC: 107 MG/DL (ref 65–140)
HCT VFR BLD AUTO: 40.9 % (ref 34.8–46.1)
HGB BLD-MCNC: 13.9 G/DL (ref 11.5–15.4)
IMM GRANULOCYTES # BLD AUTO: 0.03 THOUSAND/UL (ref 0–0.2)
IMM GRANULOCYTES NFR BLD AUTO: 0 % (ref 0–2)
LYMPHOCYTES # BLD AUTO: 4.26 THOUSANDS/ÂΜL (ref 0.6–4.47)
LYMPHOCYTES NFR BLD AUTO: 34 % (ref 14–44)
MAGNESIUM SERPL-MCNC: 1.8 MG/DL (ref 1.9–2.7)
MCH RBC QN AUTO: 31.4 PG (ref 26.8–34.3)
MCHC RBC AUTO-ENTMCNC: 34 G/DL (ref 31.4–37.4)
MCV RBC AUTO: 92 FL (ref 82–98)
MONOCYTES # BLD AUTO: 0.75 THOUSAND/ÂΜL (ref 0.17–1.22)
MONOCYTES NFR BLD AUTO: 6 % (ref 4–12)
NEUTROPHILS # BLD AUTO: 7.01 THOUSANDS/ÂΜL (ref 1.85–7.62)
NEUTS SEG NFR BLD AUTO: 56 % (ref 43–75)
NRBC BLD AUTO-RTO: 0 /100 WBCS
PLATELET # BLD AUTO: 277 THOUSANDS/UL (ref 149–390)
PMV BLD AUTO: 9.1 FL (ref 8.9–12.7)
POTASSIUM SERPL-SCNC: 3.9 MMOL/L (ref 3.5–5.3)
PROT SERPL-MCNC: 6.7 G/DL (ref 6.4–8.4)
RBC # BLD AUTO: 4.43 MILLION/UL (ref 3.81–5.12)
SODIUM SERPL-SCNC: 137 MMOL/L (ref 135–147)
WBC # BLD AUTO: 12.49 THOUSAND/UL (ref 4.31–10.16)

## 2023-11-08 PROCEDURE — 80053 COMPREHEN METABOLIC PANEL: CPT | Performed by: EMERGENCY MEDICINE

## 2023-11-08 PROCEDURE — 36415 COLL VENOUS BLD VENIPUNCTURE: CPT | Performed by: EMERGENCY MEDICINE

## 2023-11-08 PROCEDURE — 70450 CT HEAD/BRAIN W/O DYE: CPT

## 2023-11-08 PROCEDURE — 99284 EMERGENCY DEPT VISIT MOD MDM: CPT | Performed by: EMERGENCY MEDICINE

## 2023-11-08 PROCEDURE — G1004 CDSM NDSC: HCPCS

## 2023-11-08 PROCEDURE — 85025 COMPLETE CBC W/AUTO DIFF WBC: CPT | Performed by: EMERGENCY MEDICINE

## 2023-11-08 PROCEDURE — 83735 ASSAY OF MAGNESIUM: CPT | Performed by: EMERGENCY MEDICINE

## 2023-11-08 PROCEDURE — 99283 EMERGENCY DEPT VISIT LOW MDM: CPT

## 2023-11-08 RX ORDER — LANOLIN ALCOHOL/MO/W.PET/CERES
800 CREAM (GRAM) TOPICAL ONCE
Status: COMPLETED | OUTPATIENT
Start: 2023-11-08 | End: 2023-11-08

## 2023-11-08 RX ADMIN — Medication 800 MG: at 20:00

## 2024-06-12 ENCOUNTER — TELEPHONE (OUTPATIENT)
Dept: PSYCHIATRY | Facility: CLINIC | Age: 49
End: 2024-06-12

## 2024-06-12 NOTE — TELEPHONE ENCOUNTER
Due to no response to MyChart wait list letter, patient will be removed from Talk Therapy wait list.

## 2024-10-03 PROBLEM — E11.9 TYPE 2 DIABETES MELLITUS WITHOUT COMPLICATION, WITHOUT LONG-TERM CURRENT USE OF INSULIN (HCC): Status: ACTIVE | Noted: 2018-01-18

## 2024-10-03 PROBLEM — M25.562 CHRONIC PAIN OF LEFT KNEE: Status: ACTIVE | Noted: 2019-02-25

## 2024-10-03 PROBLEM — Z86.73 HISTORY OF STROKE: Status: ACTIVE | Noted: 2024-04-16

## 2024-10-03 PROBLEM — R91.8 LUNG NODULES: Status: ACTIVE | Noted: 2019-03-08

## 2024-10-03 PROBLEM — K31.84 GASTROPARESIS: Status: ACTIVE | Noted: 2018-03-19

## 2024-10-03 PROBLEM — G89.29 CHRONIC PAIN OF LEFT KNEE: Status: ACTIVE | Noted: 2019-02-25

## 2024-10-03 PROBLEM — E78.5 HYPERLIPIDEMIA: Status: ACTIVE | Noted: 2017-08-21

## 2024-10-03 PROBLEM — F17.210 CIGARETTE SMOKER: Status: ACTIVE | Noted: 2019-03-08

## 2024-10-03 PROBLEM — E04.2 MULTIPLE THYROID NODULES: Status: ACTIVE | Noted: 2019-03-21

## 2024-10-03 PROBLEM — R80.9 MICROALBUMINURIA: Status: ACTIVE | Noted: 2019-09-23

## 2024-10-03 PROBLEM — E03.9 ACQUIRED HYPOTHYROIDISM: Chronic | Status: ACTIVE | Noted: 2017-08-21

## 2024-10-03 PROBLEM — J43.2 CENTRILOBULAR EMPHYSEMA (HCC): Status: ACTIVE | Noted: 2019-03-08

## 2024-10-03 PROBLEM — G56.02 CARPAL TUNNEL SYNDROME OF LEFT WRIST: Status: ACTIVE | Noted: 2019-02-25

## 2024-10-03 PROBLEM — F43.10 PTSD (POST-TRAUMATIC STRESS DISORDER): Status: ACTIVE | Noted: 2023-09-27

## 2025-03-06 ENCOUNTER — OFFICE VISIT (OUTPATIENT)
Dept: ENDOCRINOLOGY | Facility: CLINIC | Age: 50
End: 2025-03-06
Payer: COMMERCIAL

## 2025-03-06 VITALS
WEIGHT: 217.6 LBS | OXYGEN SATURATION: 96 % | BODY MASS INDEX: 34.08 KG/M2 | TEMPERATURE: 98.1 F | HEART RATE: 86 BPM | RESPIRATION RATE: 18 BRPM

## 2025-03-06 DIAGNOSIS — E78.2 MIXED HYPERLIPIDEMIA: ICD-10-CM

## 2025-03-06 DIAGNOSIS — E11.9 TYPE 2 DIABETES MELLITUS WITHOUT COMPLICATION, WITHOUT LONG-TERM CURRENT USE OF INSULIN (HCC): Primary | ICD-10-CM

## 2025-03-06 DIAGNOSIS — R80.9 MICROALBUMINURIA: ICD-10-CM

## 2025-03-06 PROCEDURE — 99244 OFF/OP CNSLTJ NEW/EST MOD 40: CPT | Performed by: STUDENT IN AN ORGANIZED HEALTH CARE EDUCATION/TRAINING PROGRAM

## 2025-03-06 RX ORDER — BLOOD SUGAR DIAGNOSTIC
STRIP MISCELLANEOUS
Qty: 100 EACH | Refills: 2 | Status: SHIPPED | OUTPATIENT
Start: 2025-03-06

## 2025-03-06 RX ORDER — ATORVASTATIN CALCIUM 20 MG/1
20 TABLET, FILM COATED ORAL DAILY
COMMUNITY
Start: 2025-02-27 | End: 2026-02-27

## 2025-03-06 RX ORDER — BLOOD SUGAR DIAGNOSTIC
1 STRIP MISCELLANEOUS 4 TIMES DAILY
Qty: 300 EACH | Refills: 1 | Status: SHIPPED | OUTPATIENT
Start: 2025-03-06

## 2025-03-06 RX ORDER — HUMAN INSULIN 100 [USP'U]/ML
INJECTION, SUSPENSION SUBCUTANEOUS
Qty: 42 ML | Refills: 1 | Status: SHIPPED | OUTPATIENT
Start: 2025-03-06 | End: 2025-03-11

## 2025-03-06 RX ORDER — CLONAZEPAM 0.5 MG/1
0.5 TABLET ORAL DAILY PRN
COMMUNITY
Start: 2025-01-27

## 2025-03-06 RX ORDER — BUPROPION HYDROCHLORIDE 200 MG/1
200 TABLET, EXTENDED RELEASE ORAL DAILY
COMMUNITY
Start: 2025-03-03

## 2025-03-06 RX ORDER — ACYCLOVIR 400 MG/1
1 TABLET ORAL
Qty: 9 EACH | Refills: 0 | Status: SHIPPED | OUTPATIENT
Start: 2025-03-06 | End: 2025-06-04

## 2025-03-06 RX ORDER — TIRZEPATIDE 10 MG/.5ML
10 INJECTION, SOLUTION SUBCUTANEOUS WEEKLY
Qty: 6 ML | Refills: 0 | Status: SHIPPED | OUTPATIENT
Start: 2025-03-06 | End: 2025-03-11

## 2025-03-06 RX ORDER — HUMAN INSULIN 100 [USP'U]/ML
INJECTION, SUSPENSION SUBCUTANEOUS
Qty: 42 ML | Refills: 1 | Status: SHIPPED | OUTPATIENT
Start: 2025-03-06 | End: 2025-03-06 | Stop reason: SDUPTHER

## 2025-03-06 RX ORDER — PEN NEEDLE, DIABETIC 29 G X1/2"
NEEDLE, DISPOSABLE MISCELLANEOUS
COMMUNITY
Start: 2025-02-21

## 2025-03-06 RX ORDER — ACYCLOVIR 400 MG/1
1 TABLET ORAL CONTINUOUS
Qty: 1 EACH | Refills: 0 | Status: SHIPPED | OUTPATIENT
Start: 2025-03-06

## 2025-03-06 RX ORDER — BLOOD SUGAR DIAGNOSTIC
STRIP MISCELLANEOUS 2 TIMES DAILY
COMMUNITY
Start: 2025-02-13 | End: 2025-03-06 | Stop reason: SDUPTHER

## 2025-03-06 RX ORDER — FAMOTIDINE 40 MG/1
40 TABLET, FILM COATED ORAL DAILY
COMMUNITY

## 2025-03-06 NOTE — PATIENT INSTRUCTIONS
I started Mounjaro 2.5 mg weekly for 4 months, if this get through, before your last dose call me to order the next dose which is 5 mg weekly.  Continue metformin at current dose  Increase your Novolin to 22 units in the morning before breakfast and 24 units before dinner.  Please check your blood sugars 3-4 times a day and bring your sugar log in 1 month to review.  I ordered continuous glucose monitoring Dexcom G7 when you receive it, call us for training.  Return back in 3 months

## 2025-03-06 NOTE — ASSESSMENT & PLAN NOTE
Lab Results   Component Value Date    HGBA1C 10.3 (H) 01/23/2025     Poorly controlled diabetes, with A1C of 10.3% and the goal is < 7%.  We discussed pathophysiology of type 2 diabetes and importance of glycemic control to avoid complications,    Patient denies personal and family history of MCT and MEN2 tumors. Patient denies personal history of pancreatitis. Side effects discussed but not limited to diarrhea, bloating, constipation, GI upset, heartburn, increased heart rate, headache, low blood sugar, fatigue and dizziness. Titration and medication administration discussed.  Mounjaro 2.5 mg weekly and to increase monthly as she tolerates.  Novolin was increased to 22 units qam, and 24 units before dinner.  Continue metformin  I have asked the patient to check blood sugars 4 daily and send a record to the office in few weeks for review so that changes can be made to regimen, if applicable.   Emphasized importance of daily exercise, weight loss, caloric reduction, small meals, consumption of multiple servings of fruits and vegetables and avoidance of concentrated sweets such as juice and soda.   She was referred to CDE for MNT.  Dexcom G7 ordered.  RTC in 3 months.  Labs prior to next visit.          Orders:    Ambulatory referral to Diabetic Education; Future    Tirzepatide (Mounjaro) 10 MG/0.5ML SOAJ; Inject 10 mg under the skin once a week    Continuous Glucose Sensor (Dexcom G7 Sensor); Use 1 Device every 10 days    Continuous Glucose  (Dexcom G7 ) IRA; Use 1 each continuous    Hemoglobin A1C; Future    Comprehensive metabolic panel; Future    glucose blood (OneTouch Verio) test strip; Use as instructed    OneTouch Verio test strip; Use 1 each 4 times a day Test

## 2025-03-06 NOTE — PROGRESS NOTES
Name: La Nena Henderson      : 1975      MRN: [id number]  Encounter Provider: Gerardo Reid MD  Encounter Date: 3/6/2025   Encounter department: [facility] [place]    Chief Complaint   Patient presents with    Advice Only   :  Assessment & Plan  Type 2 diabetes mellitus without complication, without long-term current use of insulin (HCC)    Lab Results   Component Value Date    HGBA1C 10.3 (H) 2025     Poorly controlled diabetes, with A1C of 10.3% and the goal is < 7%.  We discussed pathophysiology of type 2 diabetes and importance of glycemic control to avoid complications,    Patient denies personal and family history of MCT and MEN2 tumors. Patient denies personal history of pancreatitis. Side effects discussed but not limited to diarrhea, bloating, constipation, GI upset, heartburn, increased heart rate, headache, low blood sugar, fatigue and dizziness. Titration and medication administration discussed.  Mounjaro 2.5 mg weekly and to increase monthly as she tolerates.  Novolin was increased to 22 units qam, and 24 units before dinner.  Continue metformin  I have asked the patient to check blood sugars 4 daily and send a record to the office in few weeks for review so that changes can be made to regimen, if applicable.   Emphasized importance of daily exercise, weight loss, caloric reduction, small meals, consumption of multiple servings of fruits and vegetables and avoidance of concentrated sweets such as juice and soda.   She was referred to CDE for MNT.  Dexcom G7 ordered.  RTC in 3 months.  Labs prior to next visit.          Orders:    Ambulatory referral to Diabetic Education; Future    Tirzepatide (Mounjaro) 10 MG/0.5ML SOAJ; Inject 10 mg under the skin once a week    Continuous Glucose Sensor (Dexcom G7 Sensor); Use 1 Device every 10 days    Continuous Glucose  (Dexcom G7 ) IRA; Use 1 each continuous    Hemoglobin A1C; Future    Comprehensive  "metabolic panel; Future    glucose blood (OneTouch Verio) test strip; Use as instructed    OneTouch Verio test strip; Use 1 each 4 times a day Test    Mixed hyperlipidemia  Lipitor 20 mg daily.   LDL goal < 70.       Microalbuminuria  Currently not on RAASi. Previously was not started due to soft blood pressure.  Not SGLT-2I , does not recall previous use even it was mentioned in her chart which caused recurrent yeast. Will consider to start at her next visit.           History of Present Illness   History of Present Illness    La Nena Henderson is a 49 y.o. female who referred by Rahel Joyce MD for management of type 2 diabetes.      History of diabetes since 2017, previously was following with [facility] endocrinology, last visit on 10/23/2023.    She had 2 recent ED visits for hyperglycemia, last one on 2/23/2025, blood sugars were more than 400.      history of yeast infection on Jardiance was reported in her chart although she does not recall using Jardiance.  Not taking Victoza.  Currently on Novolin 70/30 20 units twice a day.  Metformin 1000 mg bid    SMBG:  multiple times a day.and blood sugars are ranging from 200 - 300 mg/dl and occasionally above 400    Hypoglycemia awareness: yes     Foot exam: overdue   Eye exam:UTD     Statin:Lipitor 20 mg daily.   ACE inhibitor/ARB's:no    She reports feeling very sweaty, tired when her blood sugars are high    No hx of pancreatitis ,  No family or personal hx of MTC or MEN        Pertinent Medical History              Review of Systems as per HPI  Medical History Reviewed by provider this encounter:     .  Current Outpatient Medications on File Prior to Visit   Medication Sig Dispense Refill    aspirin 81 mg chewable tablet Chew 81 mg daily      atorvastatin (LIPITOR) 20 mg tablet Take 20 mg by mouth daily      BD Insulin Syringe U/F 31G X 5/16\" 0.3 ML MISC inject twice a day as directed      BD Pen Needle Xuan 2nd Gen 32G X 4 MM MISC USE DAILY WITH VICTOZA      buPROPion " (WELLBUTRIN SR) 200 MG 12 hr tablet Take 200 mg by mouth daily      citalopram (CeleXA) 40 mg tablet Take 40 mg by mouth daily      clonazePAM (KlonoPIN) 0.5 mg tablet Take 0.5 mg by mouth daily as needed      doxepin (SINEquan) 10 mg capsule Take 20 mg by mouth daily at bedtime      Lidocaine Viscous HCl (XYLOCAINE) 2 % mucosal solution USE AS DIRECTED 100 mL 1    metFORMIN (GLUCOPHAGE) 1000 MG tablet Take 1,000 mg by mouth 2 (two) times a day with meals      prazosin (MINIPRESS) 2 mg capsule Take 2 mg by mouth daily at bedtime      traZODone (DESYREL) 100 mg tablet Take 100 mg by mouth daily at bedtime as needed      [DISCONTINUED] insulin NPH-insulin regular (NovoLIN 70/30) 100 units/mL subcutaneous injection Inject 15 Units under the skin daily (Patient taking differently: Inject 20 Units under the skin daily 20 units twice a day)      [DISCONTINUED] OneTouch Verio test strip 2 (two) times a day Test      ARIPiprazole (ABILIFY) 15 mg tablet Take 15 mg by mouth daily (Patient not taking: Reported on 3/6/2025)      ARIPiprazole (ABILIFY) 5 mg tablet Take 5 mg by mouth daily (Patient not taking: Reported on 3/6/2025)      famotidine (PEPCID) 40 MG tablet Take 40 mg by mouth daily      levothyroxine 50 mcg tablet Take 50 mcg by mouth daily (Patient not taking: Reported on 3/6/2025)      levothyroxine 50 mcg tablet Take 50 mcg by mouth daily (Patient not taking: Reported on 3/6/2025)      [DISCONTINUED] liraglutide (Victoza) injection Inject 1.8 mg under the skin daily (Patient not taking: Reported on 3/6/2025)       No current facility-administered medications on file prior to visit.         Medical History Reviewed by provider this encounter:     .    Objective   Pulse 86   Temp 98.1 °F (36.7 °C) (Temporal)   Resp 18   Wt 98.7 kg (217 lb 9.6 oz)   SpO2 96%   BMI 34.08 kg/m²      Body mass index is 34.08 kg/m².  Wt Readings from Last 3 Encounters:   03/06/25 98.7 kg (217 lb 9.6 oz)   10/10/23 78.5 kg (173 lb)    06/14/23 83.9 kg (185 lb)     Physical Exam  Constitutional:       General: She is not in acute distress.     Appearance: She is not ill-appearing.   HENT:      Head: Normocephalic and atraumatic.   Cardiovascular:      Pulses: no weak pulses.           Dorsalis pedis pulses are 2+ on the right side and 2+ on the left side.        Posterior tibial pulses are 2+ on the right side and 2+ on the left side.   Pulmonary:      Effort: Pulmonary effort is normal. No respiratory distress.   Feet:      Right foot:      Skin integrity: Dry skin present. No ulcer, skin breakdown, erythema, warmth or callus.      Left foot:      Skin integrity: Dry skin present. No ulcer, skin breakdown, erythema, warmth or callus.   Neurological:      Mental Status: She is oriented to person, place, and time.     Patient's shoes and socks removed.    Right Foot/Ankle   Right Foot Inspection  Skin Exam: skin normal and dry skin. Skin not intact, no warmth, no callus, no erythema, no maceration, no abnormal color, no pre-ulcer, no ulcer and no callus.     Toe Exam: No swelling, no tenderness, erythema and  no right toe deformity    Sensory   Vibration: intact  Proprioception: intact  Monofilament testing: intact    Vascular  Capillary refills: < 3 seconds  The right DP pulse is 2+. The right PT pulse is 2+.     Left Foot/Ankle  Left Foot Inspection  Skin Exam: skin normal and dry skin. Skin not intact, no warmth, no erythema, no maceration, normal color, no pre-ulcer, no ulcer and no callus.     Toe Exam: No swelling, no tenderness, no erythema and no left toe deformity.     Sensory   Vibration: intact  Proprioception: intact  Monofilament testing: intact    Vascular  Capillary refills: < 3 seconds  The left DP pulse is 2+. The left PT pulse is 2+.     Assign Risk Category  No deformity present  No loss of protective sensation  No weak pulses  Risk: 0      Physical Exam      Results    Labs:   Lab Results   Component Value Date    HGBA1C 10.3  "(H) 01/23/2025    HGBA1C 8.5 (H) 10/01/2024    HGBA1C 11.8 (H) 05/24/2024     Lab Results   Component Value Date    CREATININE 1.07 02/23/2025    CREATININE 1.04 02/22/2025    CREATININE 1.06 01/23/2025    BUN 14 02/23/2025    K 4.3 02/23/2025     02/23/2025    CO2 28 02/23/2025     GFR, Calculated   Date Value Ref Range Status   07/19/2020 74 >60 mL/min/1.73m2 Final     Comment:     Please refer to initial GFR, CALC footnote     eGFRcr   Date Value Ref Range Status   02/23/2025 63 >59 Final     No results found for: \"CHOL\", \"HDL\", \"LDL\", \"TRIG\", \"CHOLHDL\"  Lab Results   Component Value Date    ALT 26 02/22/2025    AST 18 02/22/2025    ALKPHOS 114 02/22/2025     Lab Results   Component Value Date    PKQ6MYDOIKHC 2.403 06/14/2023     Lab Results   Component Value Date    FREET4 0.6 (L) 01/23/2025     omponent  Ref Range & Units (hover) 1/23/25 11:38 AM 10/1/24  7:32 AM 5/24/24  2:06 PM 2/8/24  6:45 AM 2/4/22  7:53 AM 12/2/20  2:25 PM 11/19/20  7:53 AM 11/19/20  7:53 AM   Hemoglobin A1C 10.3 High  8.5 High  CM 11.8 High  CM 7 High              There are no Patient Instructions on file for this visit.    Discussed with the patient and all questioned fully answered. She will call me if any problems arise.    Administrative Statements   I have spent a total time of 45 minutes in caring for this patient on the day of the visit/encounter including Diagnostic results, Prognosis, Risks and benefits of tx options, Instructions for management, Patient and family education, Importance of tx compliance, Risk factor reductions, Impressions, Counseling / Coordination of care, Documenting in the medical record, Reviewing/placing orders in the medical record (including tests, medications, and/or procedures), and Obtaining or reviewing history  .  "

## 2025-03-06 NOTE — ASSESSMENT & PLAN NOTE
Currently not on RAASi. Previously was not started due to soft blood pressure.  Not SGLT-2I , does not recall previous use even it was mentioned in her chart which caused recurrent yeast. Will consider to start at her next visit.

## 2025-03-07 ENCOUNTER — TELEPHONE (OUTPATIENT)
Dept: ADMINISTRATIVE | Facility: OTHER | Age: 50
End: 2025-03-07

## 2025-03-07 ENCOUNTER — TELEPHONE (OUTPATIENT)
Age: 50
End: 2025-03-07

## 2025-03-07 NOTE — TELEPHONE ENCOUNTER
----- Message from Rebeca CASANOVA sent at 3/6/2025  3:33 PM EST -----  03/06/25 3:33 PM    Hello, our patient La Nena Henderson has had Diabetic Eye Exam completed/performed. Please assist in updating the patient chart by making an External outreach to [facility] located in [place]. The date of service is end of 2024.    Thank you,  Rebeca Maya MA  [facility] [place]

## 2025-03-07 NOTE — TELEPHONE ENCOUNTER
Upon review of the In Basket request and the patient's chart, initial outreach has been made via fax to facility. Please see Contacts section for details.     Thank you  Raffy Barrera MA

## 2025-03-07 NOTE — LETTER
Diabetic Eye Exam Form    Date Requested: 25  Patient: La Nena Henderson  Patient : 1975   Referring Provider: Gerardo Reid MD    [facility]      DIABETIC Eye Exam Date _______________________________      Type of Exam MUST be documented for Diabetic Eye Exams. Please CHECK ONE.     Retinal Exam       Dilated Retinal Exam       OCT       Optomap-Iris Exam      Fundus Photography       Left Eye - Please check Retinopathy or No Retinopathy        Exam did show retinopathy    Exam did not show retinopathy       Right Eye - Please check Retinopathy or No Retinopathy       Exam did show retinopathy    Exam did not show retinopathy       Comments __________________________________________________________    Practice Providing Exam ______________________________________________    Exam Performed By (print name) _______________________________________      Provider Signature ___________________________________________________      These reports are needed for  compliance.  Please fax this completed form and a copy of the Diabetic Eye Exam report to our office located at [address] [place] [address] as soon as possible via Fax [phone] attention Raffy: Phone [phone]  We thank you for your assistance in treating our mutual patient.

## 2025-03-07 NOTE — TELEPHONE ENCOUNTER
PA for Mounjaro 10MG SUBMITTED to [employer]    via    [x]CMM-KEY: [id number]  [x]PA sent as URGENT    All office notes, labs and other pertaining documents and studies sent. Clinical questions answered. Awaiting determination from insurance company.     Turnaround time for your insurance to make a decision on your Prior Authorization can take 7-21 business days.

## 2025-03-07 NOTE — TELEPHONE ENCOUNTER
PA for (NovoLIN 70/30) 100 units SUBMITTED to [employer]    via    [x]CMM-KEY: [id number]  [x]PA sent as URGENT    All office notes, labs and other pertaining documents and studies sent. Clinical questions answered. Awaiting determination from insurance company.     Turnaround time for your insurance to make a decision on your Prior Authorization can take 7-21 business days.

## 2025-03-10 NOTE — TELEPHONE ENCOUNTER
PA for Mounjaro 10MG  DENIED    Reason:        Message sent to office clinical pool Yes    Denial letter scanned into Media Yes    Appeal started No (Provider will need to decide if appeal is warranted and send clinical documentation to Prior Authorization Team for initiation.)    **Please follow up with your patient regarding denial and next steps**

## 2025-03-10 NOTE — TELEPHONE ENCOUNTER
Upon review of the In Basket request we were able to locate, review, and update the patient chart as requested for Diabetic Eye Exam.    Any additional questions or concerns should be emailed to the Practice Liaisons via the appropriate education email address, please do not reply via In Basket.    Thank you  Raffy Barrera MA   [facility]

## 2025-03-10 NOTE — TELEPHONE ENCOUNTER
PA for (NovoLIN 70/30) 100 units  DENIED    Reason:      Message sent to office clinical pool Yes    Denial letter scanned into Media Yes    Appeal started No (Provider will need to decide if appeal is warranted and send clinical documentation to Prior Authorization Team for initiation.)    **Please follow up with your patient regarding denial and next steps**

## 2025-03-11 ENCOUNTER — TELEPHONE (OUTPATIENT)
Age: 50
End: 2025-03-11

## 2025-03-11 DIAGNOSIS — E11.9 TYPE 2 DIABETES MELLITUS WITHOUT COMPLICATION, WITHOUT LONG-TERM CURRENT USE OF INSULIN (HCC): Primary | ICD-10-CM

## 2025-03-11 RX ORDER — DULAGLUTIDE 0.75 MG/.5ML
0.75 INJECTION, SOLUTION SUBCUTANEOUS WEEKLY
Qty: 2 ML | Refills: 0 | Status: SHIPPED | OUTPATIENT
Start: 2025-03-11

## 2025-03-11 RX ORDER — DULAGLUTIDE 1.5 MG/.5ML
1.5 INJECTION, SOLUTION SUBCUTANEOUS WEEKLY
Qty: 6 ML | Refills: 0 | Status: SHIPPED | OUTPATIENT
Start: 2025-03-11

## 2025-03-11 RX ORDER — INSULIN ASPART 100 [IU]/ML
INJECTION, SUSPENSION SUBCUTANEOUS
Qty: 45 ML | Refills: 1 | Status: SHIPPED | OUTPATIENT
Start: 2025-03-11

## 2025-03-11 NOTE — TELEPHONE ENCOUNTER
Patient calling in stating the insulin aspart protamine-insulin aspart (NovoLOG Mix 70/30 FlexPen) 100 Units/mL injection pen is not covered by insurance. States a PA would be required for pen. Per patient, the vial form of this medication would be covered- patient states she is ok with using vial if provider agrees?  States she would like order to go to [facility] if a new script will be sent.  Please advise- patient requesting update if new script will be sent for alternative or if PA will need to be submitted. Thank you

## 2025-03-11 NOTE — TELEPHONE ENCOUNTER
Called patient and informed. She hasn't picked up the dexcom yet bc they are out of stock. Instructed to fingerstick. She will  the other prescriptions

## 2025-03-12 NOTE — TELEPHONE ENCOUNTER
Called pharmacy. Trulicity was covered and will be ready tomorrow. Novolog is covered and they ordered and will be ready for tomorrow. I called patient and left a message.  There is another encounter started as well.     Pharmacy asked about prescription from Dr Nazario.He put in for Humalin vial 15 units daily. Spoke to provider and he doesn't want him on that so I had pharmacy put that on hold.  Dr Reid wants only the Novolog

## 2025-03-13 NOTE — PROGRESS NOTES
Medical Nutrition Therapy        Assessment    Visit Type: Initial visit  Chief complaint/Medical Diagnosis/reason for visit E11.9    HPI La Nena was seen in person for the initial MNT appointment. BG levels are checked 4x/day. FBG in the morning is usually 200-250 and before dinner is 293 mg/dl. Patient does take diabetes medication as prescribed.     Smoking cessation was achieved. La Nena has not smoked for 4 months.    Patient reported no current exercise regimen. She is aware of the benefits and is open to adding in an exercise routine.    La Nena came today frustrated due to lack of previous diabetes education and a gap in endocrinology care. She was confused as to how to manage her diabetes and even reported unknowingly not taking insulin doses with improper injection technique.    La Nena's diabetes management is complicated by her co-condition of gastroparesis. Diet delg[place]illo, La Nena is unsure of her intake because she does not measure portions but is interested in improving her glucose management.  Noted her snacks of frozen fruit are greater than 45 grams of carb.    Problems identified in food recall include meal skipping, inconsistent carbohydrate intake, high-fat and high-sodium convenience foods, high-fat dairy, limited non-starchy vegetables and whole grains, sugary beverages, and sweets. Consumption of carbohydrates ranges from 30 to 45 grams per meal. Explained basic pathophysiology of diabetes and impact of diet on blood glucose levels and disease complications. Explained how sodium influences volume retention, blood pressure, and complications for heart disease, stroke, and CKD.     Provided patient with a 1970 calorie meal plan to assist with consistency, balance and portion control.  Encouraged the consumption of regular meals at regular times.  Advised patient to keep carbohydrate intake to 30 grams per meal and 15-20 per snack to assist with glycemic control.  Suggested keeping protein intake to 6  "ounces a day and fat to 4 servings daily to assist with lipid management and calorie control. Portion booklet and food labels were used to teach basic carbohydrate counting. Patient agreed to keep daily food logs and return them in 2 months for assessment. RD will remain available for further dietary questions/concerns.     Ht Readings from Last 1 Encounters:   10/10/23 5' 7\" (1.702 m)     Wt Readings from Last 3 Encounters:   03/06/25 98.7 kg (217 lb 9.6 oz)   10/10/23 78.5 kg (173 lb)   06/14/23 83.9 kg (185 lb)     Weight Change: No    Barriers to Learning: No     Do you follow any special diet presently?: No  Who shops: patient  Who cooks: patient    Food Log: Completed via the method of food recall    Wakes up 6 am    Coffee (regular; regular Tajik vanilla creamer)    Breakfast:6:30 am; 1 c Honey Nut cheerios with 1 c milk (whole)   Morning Snack:Coffee (regular;  regular [place] vanilla creamer)  Lunch: 10:30 am-11:15 am; skips; peanut butter and SF jelly sandwich (white)  and water and sometimes 1 c crackers (gold fish)  Afternoon Snack: 1 1/2 c frozen fruit (strawberry/banana and pineapple or peaches) with water  Dinner:6 pm; 1 1/2 c mac n cheese with water OR chicken w/ breadcrumbs and 1 c buttered noodles and sometimes broccoli with water  Evening Snack: frozen fruit or chocolate tasty kakes  Beverages: a pot of Coffee (regular; regular Tajik vanilla creamer), water, regular [place] green tea  Eating out/Take out: 2x/ month; [employer]- [employer] [place] (lasagna and chicken, did not eat the noodles) w/ salad (no bread sticks) with water  Exercise ADL    Calorie needs 1470 kcals/day Carbs: 30 g/meal, 15-20 g/snack     Protein:6 ounces/day    Fat: 4 servings/day        Nutrition Diagnosis:  Inconsistent carbohydrate intake  intake related to Food and nutrition related knowledge deficit concerning appropriate amount and timing of carbohydrate intake as evidenced by  Estimated carbohydrate intake that is " different from recommended types or ingested on an irregular basis    Intervention: plate method, label reading, carbohydrate counting, meal timing, meal planning, and food diary     Treatment Goals: Patient understands education and recommendations, Patient will monitor food intake daily with tracker, Patient will consume 3 meals a day, Patient will monitor portion control, Patient will count carbohydrates, and Patient will monitor blood glucose    Monitoring and evaluation:    Term code indicator  FH 1.3.2 Food Intake Criteria: Eat 3 meals per day, 4-5 hours apart. No meal skipping.   Term code indicator  FH 1.6.3 Carbohydrate Intake Criteria: Eat 30 grams of carbohydrate per meal, and no more than 15-20 grams per snack     Materials Provided: portion book, 3-day food log    Patient’s Response to Instruction:  Comprehensiongood  Motivationgood  Expected Compliancegood    Begin Time: 3:54 pm  End Time: 5:03 pm  Referring Provider: Gerardo Reid MD    Thank you for coming to the [facility] for education today.  Please feel free to call with any questions or concerns.    Kaylee Mcgovern, RD  [address] [id number] [phone]

## 2025-03-14 ENCOUNTER — TELEPHONE (OUTPATIENT)
Age: 50
End: 2025-03-14

## 2025-03-14 DIAGNOSIS — E11.9 TYPE 2 DIABETES MELLITUS WITHOUT COMPLICATION, WITHOUT LONG-TERM CURRENT USE OF INSULIN (HCC): Primary | ICD-10-CM

## 2025-03-14 RX ORDER — PEN NEEDLE, DIABETIC 32GX 5/32"
NEEDLE, DISPOSABLE MISCELLANEOUS 2 TIMES DAILY
Qty: 200 EACH | Refills: 1 | Status: SHIPPED | OUTPATIENT
Start: 2025-03-14

## 2025-03-14 NOTE — TELEPHONE ENCOUNTER
Jada from [facility] called because they received the novolog script but they did not receive a script for the needles to inject the insulin. Please order needles to [facility]. Thank you

## 2025-03-14 NOTE — TELEPHONE ENCOUNTER
Patient called stating she was able to get her insulin pen but she does not have needles. Informed her that there was a script sent today for pen needles and that was sent to the [facility] in [place]. Patient expressed understanding.  No further action needed

## 2025-03-18 ENCOUNTER — CONSULT (OUTPATIENT)
Dept: DIABETES SERVICES | Facility: CLINIC | Age: 50
End: 2025-03-18
Payer: COMMERCIAL

## 2025-03-18 VITALS — WEIGHT: 217.2 LBS | BODY MASS INDEX: 34.02 KG/M2

## 2025-03-18 DIAGNOSIS — E11.9 TYPE 2 DIABETES MELLITUS WITHOUT COMPLICATION, WITHOUT LONG-TERM CURRENT USE OF INSULIN (HCC): Primary | ICD-10-CM

## 2025-03-18 PROCEDURE — 97802 MEDICAL NUTRITION INDIV IN: CPT

## 2025-03-18 PROCEDURE — PBNCHG PB NO CHARGE PLACEHOLDER

## 2025-03-18 NOTE — PATIENT INSTRUCTIONS
Eat 3 meals per day, 4-5 hours apart. No meal skipping.     Eat 30 grams of carbohydrate per meal, and no more than 15-20 grams per snack     Complete 3-day food log and return completed log at the follow up appointment

## 2025-04-07 ENCOUNTER — TELEPHONE (OUTPATIENT)
Age: 50
End: 2025-04-07

## 2025-04-07 NOTE — TELEPHONE ENCOUNTER
Patient called stating this will be her 4th week on the Trulicity and she said it looks like she is 7-8 months pregnant. She said it is uncomfortable. She decreased her food and increased her fluid intake. I asked her if she thinks she is retaining fluid and she said she is unsure. She also struggles with gastroparesis. She wants to know what the provider can suggest? Should the medication be increased? She denies chest pain and shortness of breath. Please advise.

## 2025-04-07 NOTE — TELEPHONE ENCOUNTER
Patient calling back stating she was researching Trulicity and it said that it can cause gastroparesis.  Patient states she already has gastroparesis. She is asking if there is something she should do to help with these side effects? Should she see GI or just monitor for now?  Please advise

## 2025-04-07 NOTE — TELEPHONE ENCOUNTER
Called patient and went over recommendations. She will follow the recommendations but want to know if the provider recommends anything for the weight loss.

## 2025-04-08 DIAGNOSIS — E11.9 TYPE 2 DIABETES MELLITUS WITHOUT COMPLICATION, WITHOUT LONG-TERM CURRENT USE OF INSULIN (HCC): Primary | ICD-10-CM

## 2025-04-08 DIAGNOSIS — R11.0 NAUSEA: ICD-10-CM

## 2025-04-08 RX ORDER — ONDANSETRON 4 MG/1
4 TABLET, FILM COATED ORAL EVERY 8 HOURS PRN
Qty: 20 TABLET | Refills: 0 | Status: SHIPPED | OUTPATIENT
Start: 2025-04-08

## 2025-04-08 NOTE — TELEPHONE ENCOUNTER
Patient verbalized understanding and made aware:  Gerardo Reid MD to Rebeca Maya MA       4/8/25  9:51 AM   Mechanism of action of this group of medication which is called GLP-1 agonist like trulucity, ozempic or mounjaro is slowing down stomach emptying which mimics or sometime worsens symptom of gastroparesis,  Therefore if she could not tolerate Trulucity I do anticipate all of the medication from this group will cause the same symptoms, so, unfortunately despite great health benefit from this group, we have to defer resuming or changing to different medications from these group,  For symptoms relief I can order Zofran to help alleviating symptoms    Patient verbalized understanding.

## 2025-04-18 NOTE — TELEPHONE ENCOUNTER
Patient called stating she is still experiencing the bloating to the point it looks like she is 8 months pregnant. She wants to know if Dr. Reid has any other suggestions? She was relayed message by other CTS on 4/8. Please advise.

## 2025-06-23 ENCOUNTER — TELEPHONE (OUTPATIENT)
Age: 50
End: 2025-06-23

## 2025-06-23 NOTE — TELEPHONE ENCOUNTER
Patient called.  She was trying to get a sooner appointment.  Says her bs are out of control the dexcom doesn't stay on her arm.   Her A1C was at 10. She needs this number down to be able to get a shot in her knee. She's in agony with the knee.  Please call patient back.  I tried to move appointment to another office nothing sooner.   Patient didn't have her dexcom with her at the time for bs readings

## 2025-06-24 ENCOUNTER — OFFICE VISIT (OUTPATIENT)
Dept: ENDOCRINOLOGY | Facility: CLINIC | Age: 50
End: 2025-06-24
Payer: COMMERCIAL

## 2025-06-24 VITALS
DIASTOLIC BLOOD PRESSURE: 70 MMHG | TEMPERATURE: 97.9 F | HEART RATE: 86 BPM | BODY MASS INDEX: 33.64 KG/M2 | WEIGHT: 214.8 LBS | OXYGEN SATURATION: 97 % | SYSTOLIC BLOOD PRESSURE: 140 MMHG | RESPIRATION RATE: 18 BRPM

## 2025-06-24 DIAGNOSIS — E66.01 MORBID OBESITY (HCC): ICD-10-CM

## 2025-06-24 DIAGNOSIS — E04.2 MULTIPLE THYROID NODULES: ICD-10-CM

## 2025-06-24 DIAGNOSIS — E11.9 TYPE 2 DIABETES MELLITUS WITHOUT COMPLICATION, WITHOUT LONG-TERM CURRENT USE OF INSULIN (HCC): Primary | ICD-10-CM

## 2025-06-24 LAB — SL AMB POCT HEMOGLOBIN AIC: 10.7 (ref ?–6.5)

## 2025-06-24 PROCEDURE — 99214 OFFICE O/P EST MOD 30 MIN: CPT | Performed by: STUDENT IN AN ORGANIZED HEALTH CARE EDUCATION/TRAINING PROGRAM

## 2025-06-24 PROCEDURE — 95251 CONT GLUC MNTR ANALYSIS I&R: CPT | Performed by: STUDENT IN AN ORGANIZED HEALTH CARE EDUCATION/TRAINING PROGRAM

## 2025-06-24 PROCEDURE — 83036 HEMOGLOBIN GLYCOSYLATED A1C: CPT | Performed by: STUDENT IN AN ORGANIZED HEALTH CARE EDUCATION/TRAINING PROGRAM

## 2025-06-24 RX ORDER — ACYCLOVIR 400 MG/1
1 TABLET ORAL
Qty: 9 EACH | Refills: 1 | Status: SHIPPED | OUTPATIENT
Start: 2025-06-24

## 2025-06-24 RX ORDER — INSULIN GLARGINE 100 [IU]/ML
32 INJECTION, SOLUTION SUBCUTANEOUS
Qty: 30 ML | Refills: 1 | Status: SHIPPED | OUTPATIENT
Start: 2025-06-24

## 2025-06-24 RX ORDER — PEN NEEDLE, DIABETIC 32GX 5/32"
NEEDLE, DISPOSABLE MISCELLANEOUS 4 TIMES DAILY
Qty: 400 EACH | Refills: 1 | Status: SHIPPED | OUTPATIENT
Start: 2025-06-24

## 2025-06-24 RX ORDER — INSULIN ASPART 100 [IU]/ML
12 INJECTION, SOLUTION INTRAVENOUS; SUBCUTANEOUS
Qty: 36 ML | Refills: 1 | Status: SHIPPED | OUTPATIENT
Start: 2025-06-24

## 2025-06-24 NOTE — PROGRESS NOTES
Name: La Nena Henderson      : 1975      MRN: [id number]  Encounter Provider: Gerardo Reid MD  Encounter Date: 2025   Encounter department: [facility] [place]    Chief Complaint   Patient presents with   • Follow-up   :  Assessment & Plan  Type 2 diabetes mellitus without complication, without long-term current use of insulin (HCC)    Lab Results   Component Value Date    HGBA1C 10.7 (A) 2025     - A1c level remains uncontrolled.  - we emphasized importance of glycemic control to avoid complications.  - Currently taking metformin 1000 mg twice a day and NovoLog 70/30, 24 units before breakfast and 28 units before dinner.  - Discussed potential side effects of Jardiance, including urinary tract infection and yeast infection. Advised to maintain adequate hydration.  - Prescribed Jardiance 25 mg daily,   - her insulin regimen changed to NovoLog 12 units before each meal, and Lantus 32 units at bedtime.   - could not tolerate GLP-1A due to gastroparesis.  Emphasized importance of daily exercise, weight loss, caloric reduction, small meals, consumption of multiple servings of fruits and vegetables and avoidance of concentrated sweets such as juice and soda.   Return back in 2 months.  - Advised to schedule an appointment with an ophthalmologist for an annual eye examination.    Orders:  •  POCT hemoglobin A1c  •  Empagliflozin 25 MG TABS; Take 1 tablet (25 mg total) by mouth every morning  •  Continuous Glucose Sensor (Dexcom G7 Sensor); Use 1 Device every 10 days  •  Insulin Glargine Solostar (Lantus SoloStar) 100 UNIT/ML SOPN; Inject 0.32 mL (32 Units total) under the skin daily at bedtime  •  insulin aspart (NovoLOG FlexPen) 100 UNIT/ML injection pen; Inject 12 Units under the skin 3 (three) times a day with meals  •  BD Pen Needle Xuan 2nd Gen 32G X 4 MM MISC; Inject under the skin in the morning and at noon and in the evening and before bedtime.  •  Hemoglobin A1C;  Future  •  Comprehensive metabolic panel; Future    Morbid obesity (HCC)    Orders:  •  Ambulatory Referral to Weight Management; Future    Multiple thyroid nodules  History of multiple thyroid nodules, which did not required further imaging or biopsy,  She was previously on levothyroxine although she is not currently taking.  TSH was ordered for further evaluation.   Orders:  •  TSH, 3rd generation with Free T4 reflex; Future          Pertinent Medical History           History of Present Illness   History of Present Illness    La Nena Henderson is a 50 y.o. female with type 2 diabetes seen in follow up. Reports complications of gastroparesis. Denies recent severe hypoglycemic or severe hyperglycemic episodes. Denies any issues with her current regimen. Last A1C was 10.7. Denies recent illness, hospitalization or steroid use.     She has been managing her blood sugar levels, which were elevated in 01/2025. She is currently on metformin 1000 mg twice daily and NovoLog 70/30, administered as 24 units before breakfast and 28 units before dinner. Occasionally, she misses her morning dose of NovoLog. She uses Dexcom for glucose monitoring but reports difficulty in keeping the sensor attached.     During a recent visit to urgent care, she was informed of a potential diagnosis of stage IIIa kidney disease. Her GFR was initially reported as 47, indicating stage IIIb kidney disease, but a repeat test showed a GFR of 57, suggesting stage IIIa kidney disease. She was advised against the use of anti-inflammatories due to this condition.    She experienced a recurrence of gastroparesis while on Trulicity, which she took for a month. This led to weight gain and persistent bloating, giving her an appearance of being pregnant. She is considering bariatric surgery for weight loss. She had previously consulted with Megan, who recommended dietary tracking, but she lost her records during a move. Attempts to control her weight by reducing  food intake and increasing water consumption have not been effective. She has gained weight since quitting smoking 7 months ago.    She is due for an eye examination.    She is no longer taking levothyroxine.    SOCIAL HISTORY  The patient quit smoking over 7 months ago.  CGM Interpretation:  Date Range: June 13 - 24th  Device used: Dexcom G7  Type: Type 2 Diabetes  Home use     Analysis of data:   Average Glucose: 273 mg/dl  GMI: na %  Coefficient of Variation: 21.3%  SD: 58 mg/dL  Glucose Variability: x%  Time in Target Range: 6%   Time Above Range: 35% high, 59% very high  Time Below Range: 0% low, 0% very low    More than 72 hours of data was reviewed. Report to be scanned to chart.           Review of Systems as per HPI    Medical History Reviewed by provider this encounter:     .  Medications Ordered Prior to Encounter[1]      Medical History Reviewed by provider this encounter:     .    Objective   /70 (BP Location: Right arm, Patient Position: Sitting, Cuff Size: Adult)   Pulse 86   Temp 97.9 °F (36.6 °C) (Temporal)   Resp 18   Wt 97.4 kg (214 lb 12.8 oz)   SpO2 97%   BMI 33.64 kg/m²      Body mass index is 33.64 kg/m².  Wt Readings from Last 3 Encounters:   06/24/25 97.4 kg (214 lb 12.8 oz)   03/18/25 98.5 kg (217 lb 3.2 oz)   03/06/25 98.7 kg (217 lb 9.6 oz)     Physical Exam  Vital Signs: Blood pressure reading is 140/70.  Physical Exam  Constitutional:       General: She is not in acute distress.     Appearance: She is not ill-appearing.   HENT:      Head: Normocephalic and atraumatic.   Pulmonary:      Effort: Pulmonary effort is normal. No respiratory distress.     Neurological:      Mental Status: She is oriented to person, place, and time.       Last Eye Exam: 06/22/2023  Last Foot Exam: 03/06/2025  Health Maintenance   Topic Date Due   • Diabetic Eye Exam  06/22/2025   • Diabetic Foot Exam  03/06/2026       Results  Labs:  A1c: 10%  GFR: 57  Labs: I have reviewed pertinent labs  "including:   Lab Results   Component Value Date    HGBA1C 10.7 (A) 06/24/2025    HGBA1C 10.3 (H) 01/23/2025    HGBA1C 8.5 (H) 10/01/2024      Lab Results   Component Value Date    CREATININE 1.16 (H) 04/24/2025    CREATININE 1.07 02/23/2025    CREATININE 1.04 02/22/2025    BUN 22 04/24/2025    K 4.3 04/24/2025    CL 98 (L) 04/24/2025    CO2 28 04/24/2025      GFR, Calculated   Date Value Ref Range Status   07/19/2020 74 >60 mL/min/1.73m2 Final     Comment:     Please refer to initial GFR, CALC footnote     eGFRcr   Date Value Ref Range Status   04/24/2025 57 (L) >59 Final      No results found for: \"CHOL\", \"HDL\", \"LDL\", \"TRIG\", \"CHOLHDL\"   ALT   Date Value Ref Range Status   04/24/2025 23 <56 U/L Final     AST   Date Value Ref Range Status   04/24/2025 15 <41 U/L Final     Alkaline Phosphatase   Date Value Ref Range Status   04/24/2025 105 35 - 120 U/L Final      No results found for: \"[id number]\"     Patient Instructions   Novolog 12 units before meals and lantus 32 units at bedtime   Jardiance 25 mg daily  Metformin at current dose.      Discussed with the patient and all questioned fully answered. She will call me if any problems arise.             [1]  Current Outpatient Medications on File Prior to Visit   Medication Sig Dispense Refill   • aspirin 81 mg chewable tablet Chew 81 mg in the morning.     • atorvastatin (LIPITOR) 20 mg tablet Take 20 mg by mouth in the morning.     • BD Insulin Syringe U/F 31G X 5/16\" 0.3 ML MISC      • buPROPion (WELLBUTRIN SR) 200 MG 12 hr tablet Take 200 mg by mouth in the morning.     • citalopram (CeleXA) 40 mg tablet Take 40 mg by mouth in the morning.     • clonazePAM (KlonoPIN) 0.5 mg tablet Take 0.5 mg by mouth daily as needed     • doxepin (SINEquan) 10 mg capsule Take 20 mg by mouth daily at bedtime     • famotidine (PEPCID) 40 MG tablet Take 40 mg by mouth in the morning.     • glucose blood (OneTouch Verio) test strip Use as instructed 100 each 2   • Lidocaine Viscous " HCl (XYLOCAINE) 2 % mucosal solution USE AS DIRECTED 100 mL 1   • metFORMIN (GLUCOPHAGE) 1000 MG tablet Take 1,000 mg by mouth in the morning and 1,000 mg in the evening. Take with meals.     • ondansetron (ZOFRAN) 4 mg tablet Take 1 tablet (4 mg total) by mouth every 8 (eight) hours as needed for nausea or vomiting 20 tablet 0   • OneTouch Verio test strip Use 1 each 4 times a day Test 300 each 1   • [DISCONTINUED] BD Pen Needle Xuan 2nd Gen 32G X 4 MM MISC Inject under the skin 2 (two) times a day 200 each 1   • [DISCONTINUED] insulin aspart protamine-insulin aspart (NovoLOG Mix 70/30 FlexPen) 100 Units/mL injection pen 22 units before breakfast and 24 units before dinner (Patient taking differently: 24 units before breakfast and 28 units before dinner) 45 mL 1   • ARIPiprazole (ABILIFY) 15 mg tablet Take 15 mg by mouth daily (Patient not taking: Reported on 6/24/2025)     • ARIPiprazole (ABILIFY) 5 mg tablet Take 5 mg by mouth daily (Patient not taking: Reported on 6/24/2025)     • Continuous Glucose  (Dexcom G7 ) IRA Use 1 each continuous (Patient not taking: Reported on 6/24/2025) 1 each 0   • Dulaglutide (Trulicity) 0.75 MG/0.5ML SOAJ Inject 0.75 mg under the skin once a week (Patient not taking: Reported on 6/24/2025) 2 mL 0   • levothyroxine 50 mcg tablet Take 50 mcg by mouth daily (Patient not taking: Reported on 6/24/2025)     • levothyroxine 50 mcg tablet Take 50 mcg by mouth daily (Patient not taking: Reported on 6/14/2023)     • prazosin (MINIPRESS) 2 mg capsule Take 2 mg by mouth daily at bedtime     • traZODone (DESYREL) 100 mg tablet Take 100 mg by mouth daily at bedtime as needed (Patient not taking: Reported on 6/24/2025)     • [DISCONTINUED] Dulaglutide (Trulicity) 1.5 MG/0.5ML SOAJ Inject 1.5 mg under the skin once a week (Patient not taking: Reported on 6/24/2025) 6 mL 0     No current facility-administered medications on file prior to visit.

## 2025-06-24 NOTE — ASSESSMENT & PLAN NOTE
Lab Results   Component Value Date    HGBA1C 10.7 (A) 06/24/2025     - A1c level remains uncontrolled.  - we emphasized importance of glycemic control to avoid complications.  - Currently taking metformin 1000 mg twice a day and NovoLog 70/30, 24 units before breakfast and 28 units before dinner.  - Discussed potential side effects of Jardiance, including urinary tract infection and yeast infection. Advised to maintain adequate hydration.  - Prescribed Jardiance 25 mg daily,   - her insulin regimen changed to NovoLog 12 units before each meal, and Lantus 32 units at bedtime.   - could not tolerate GLP-1A due to gastroparesis.  Emphasized importance of daily exercise, weight loss, caloric reduction, small meals, consumption of multiple servings of fruits and vegetables and avoidance of concentrated sweets such as juice and soda.   Return back in 2 months.  - Advised to schedule an appointment with an ophthalmologist for an annual eye examination.    Orders:  •  POCT hemoglobin A1c  •  Empagliflozin 25 MG TABS; Take 1 tablet (25 mg total) by mouth every morning  •  Continuous Glucose Sensor (Dexcom G7 Sensor); Use 1 Device every 10 days  •  Insulin Glargine Solostar (Lantus SoloStar) 100 UNIT/ML SOPN; Inject 0.32 mL (32 Units total) under the skin daily at bedtime  •  insulin aspart (NovoLOG FlexPen) 100 UNIT/ML injection pen; Inject 12 Units under the skin 3 (three) times a day with meals  •  BD Pen Needle Xuan 2nd Gen 32G X 4 MM MISC; Inject under the skin in the morning and at noon and in the evening and before bedtime.  •  Hemoglobin A1C; Future  •  Comprehensive metabolic panel; Future

## 2025-06-24 NOTE — ASSESSMENT & PLAN NOTE
History of multiple thyroid nodules, which did not required further imaging or biopsy,  She was previously on levothyroxine although she is not currently taking.  TSH was ordered for further evaluation.   Orders:  •  TSH, 3rd generation with Free T4 reflex; Future

## 2025-06-24 NOTE — PATIENT INSTRUCTIONS
Novolog 12 units before meals and lantus 32 units at bedtime   Jardiance 25 mg daily  Metformin at current dose.

## 2025-06-25 ENCOUNTER — TELEPHONE (OUTPATIENT)
Age: 50
End: 2025-06-25

## 2025-06-25 NOTE — TELEPHONE ENCOUNTER
PA for (Joaquín Keller) 100 UNIT  NOT REQUIRED     Reason           Patient advised by          [] MyChart Message  [] Phone call   []LMOM  []L/M to call office as no active Communication consent on file  []Unable to leave detailed message as VM not approved on Communication consent       Pharmacy advised by    [x]Fax  []Phone call

## 2025-06-25 NOTE — TELEPHONE ENCOUNTER
PA for (Joaquín Keller) 100 UNIT SUBMITTED to [employer]    via    [x]CMM-KEY: [id number]  [x]PA sent as URGENT    All office notes, labs and other pertaining documents and studies sent. Clinical questions answered. Awaiting determination from insurance company.     Turnaround time for your insurance to make a decision on your Prior Authorization can take 7-21 business days.

## 2025-07-02 ENCOUNTER — NURSE TRIAGE (OUTPATIENT)
Age: 50
End: 2025-07-02

## 2025-07-02 NOTE — TELEPHONE ENCOUNTER
Spoke with Ms. Henderson over the phone. She reports having aches throughout her body for the past 3-4 days that did not improve with ibuprofen. She believes it might be the Lantus causing this, however also notes that she was on it I previously without issues. She was recently started on Lantus, NovoLog and Jardiance, otherwise denies changes.   States that she also has a history of arthritis and was provided with gabapentin by her PCP which improves her pain but makes her drowsy.     Offered to Ms. Henderson sending in basaglar to a pharmacy near her as she has tolerated it in the past in [place] of Lantus.   Also suggested we can try holding Jardiance tomorrow to see if she has improvement in symptoms as it is not clear which medication would be causing her symptoms.     Ms. Henderson notes that she was not aware she is calling after hours and would prefer to speak with Dr. Reid directly if symptoms persist. At this time prefers not to make changes to her regimen.  She is aware to call back if symptoms worsen.

## 2025-07-02 NOTE — TELEPHONE ENCOUNTER
"REASON FOR CONVERSATION: Muscle Pain    SYMPTOMS: joint and muscle aches and pains started a few days ago    OTHER HEALTH INFORMATION: patient recently started 3 new medications. Jardiance, Lantus and Novolog. She thinks this is causing her joint pain    PROTOCOL DISPOSITION: Home Care    CARE ADVICE PROVIDED: call back if you become worse.     PRACTICE FOLLOW-UP: message sent to on call provider.      Patient called stating \" I think the Lantus is causing a reaction\" she reports starting 3 new medication on 6/24 which include Jardiance, Lantus and Novolog. A few days ago she began with pain to her feet, ankles, arms, wrists, hands and knees. She rates her pain 4/10. She denies fever or recent illness and travel outside of the country. She looked it up online and saw that Lantus could cause the pain. Message sent to on call provider to discuss with patient.  FYI                          Reason for Disposition   Mild pain (e.g., does not interfere with normal activities) and present < 7 days    Answer Assessment - Initial Assessment Questions  1. ONSET: \"When did the muscle aches or body pains start?\"       Couple days ago    2. LOCATION: \"What part of your body is hurting?\" (e.g., entire body, arms, legs)       Everywhere    3. SEVERITY: \"How bad is the pain?\" (Scale 1-10; or mild, moderate, severe)      4/10    4. CAUSE: \"What do you think is causing the pains?\"      New medications    5. FEVER: \"Have you been having fever?\"      Denies    6. OTHER SYMPTOMS: \"Do you have any other symptoms?\" (e.g., chest pain, weakness, rash, cold or flu symptoms, weight loss)      Denies    7. PREGNANCY: \"Is there any chance you are pregnant?\" \"When was your last menstrual period?\"      Denies    8. TRAVEL: \"Have you traveled out of the country in the last month?\" (e.g., travel history, exposures)      Denies    Protocols used: Muscle Aches and Body Pain-Adult-[place]    "

## 2025-07-03 NOTE — TELEPHONE ENCOUNTER
Patient called asking if Dr. Reid can review recommendations from Dr. Christie and let her know if he agrees with the recommendations. Please advise.

## 2025-07-11 NOTE — TELEPHONE ENCOUNTER
Patient called stating she called last week and said nobody ever called her. I questioned her and said didn't you speak with Dr. Christie on 7/2. She denied that, and I was going through Dr. Christie phone call with her and she said oh yea, but I decided not to listen to her recommendations, and she wanted a call back. Informed her that Dr. Reid was out of office and that is why she didn't get a response from him.    She said she stopped the jardiance and her side effects went away. So she is not taking that anymore.

## 2025-08-06 ENCOUNTER — TELEPHONE (OUTPATIENT)
Age: 50
End: 2025-08-06

## 2025-08-07 ENCOUNTER — APPOINTMENT (OUTPATIENT)
Dept: LAB | Facility: CLINIC | Age: 50
End: 2025-08-07
Payer: COMMERCIAL

## 2025-08-12 ENCOUNTER — OFFICE VISIT (OUTPATIENT)
Dept: ENDOCRINOLOGY | Facility: CLINIC | Age: 50
End: 2025-08-12
Payer: COMMERCIAL

## 2025-08-15 ENCOUNTER — TELEPHONE (OUTPATIENT)
Age: 50
End: 2025-08-15

## 2025-08-21 ENCOUNTER — OFFICE VISIT (OUTPATIENT)
Dept: BARIATRICS | Facility: CLINIC | Age: 50
End: 2025-08-21
Attending: STUDENT IN AN ORGANIZED HEALTH CARE EDUCATION/TRAINING PROGRAM
Payer: COMMERCIAL

## 2025-08-21 VITALS
WEIGHT: 228 LBS | OXYGEN SATURATION: 98 % | SYSTOLIC BLOOD PRESSURE: 118 MMHG | HEIGHT: 67 IN | BODY MASS INDEX: 35.79 KG/M2 | DIASTOLIC BLOOD PRESSURE: 84 MMHG | HEART RATE: 91 BPM

## 2025-08-21 DIAGNOSIS — E11.9 TYPE 2 DIABETES MELLITUS WITHOUT COMPLICATION, WITHOUT LONG-TERM CURRENT USE OF INSULIN (HCC): ICD-10-CM

## 2025-08-21 DIAGNOSIS — E66.01 MORBID OBESITY (HCC): Primary | ICD-10-CM

## 2025-08-21 PROCEDURE — 99203 OFFICE O/P NEW LOW 30 MIN: CPT
